# Patient Record
Sex: FEMALE | Race: WHITE | NOT HISPANIC OR LATINO | URBAN - METROPOLITAN AREA
[De-identification: names, ages, dates, MRNs, and addresses within clinical notes are randomized per-mention and may not be internally consistent; named-entity substitution may affect disease eponyms.]

---
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
## 2022-01-05 ENCOUNTER — LABORATORY RESULT (OUTPATIENT)
Age: 22
End: 2022-01-05

## 2022-01-06 ENCOUNTER — LABORATORY RESULT (OUTPATIENT)
Age: 22
End: 2022-01-06

## 2022-01-06 ENCOUNTER — APPOINTMENT (OUTPATIENT)
Dept: OBGYN | Facility: CLINIC | Age: 22
End: 2022-01-06
Payer: COMMERCIAL

## 2022-01-06 VITALS — HEIGHT: 61 IN | TEMPERATURE: 97.9 F

## 2022-01-06 PROCEDURE — 76830 TRANSVAGINAL US NON-OB: CPT

## 2022-01-06 PROCEDURE — 99214 OFFICE O/P EST MOD 30 MIN: CPT | Mod: 25

## 2022-12-09 ENCOUNTER — INPATIENT (INPATIENT)
Facility: HOSPITAL | Age: 22
LOS: 4 days | Discharge: HOME | End: 2022-12-14
Attending: INTERNAL MEDICINE | Admitting: INTERNAL MEDICINE
Payer: COMMERCIAL

## 2022-12-09 VITALS
SYSTOLIC BLOOD PRESSURE: 131 MMHG | DIASTOLIC BLOOD PRESSURE: 71 MMHG | HEART RATE: 87 BPM | TEMPERATURE: 98 F | OXYGEN SATURATION: 99 % | RESPIRATION RATE: 18 BRPM

## 2022-12-09 DIAGNOSIS — H61.001 UNSPECIFIED PERICHONDRITIS OF RIGHT EXTERNAL EAR: ICD-10-CM

## 2022-12-09 DIAGNOSIS — R43.8 OTHER DISTURBANCES OF SMELL AND TASTE: ICD-10-CM

## 2022-12-09 DIAGNOSIS — I96 GANGRENE, NOT ELSEWHERE CLASSIFIED: ICD-10-CM
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
## 2022-12-09 DIAGNOSIS — H60.01 ABSCESS OF RIGHT EXTERNAL EAR: ICD-10-CM

## 2022-12-09 DIAGNOSIS — D50.9 IRON DEFICIENCY ANEMIA, UNSPECIFIED: ICD-10-CM

## 2022-12-09 DIAGNOSIS — B96.5 PSEUDOMONAS (AERUGINOSA) (MALLEI) (PSEUDOMALLEI) AS THE CAUSE OF DISEASES CLASSIFIED ELSEWHERE: ICD-10-CM

## 2022-12-09 DIAGNOSIS — Z20.822 CONTACT WITH AND (SUSPECTED) EXPOSURE TO COVID-19: ICD-10-CM

## 2022-12-09 DIAGNOSIS — K58.0 IRRITABLE BOWEL SYNDROME WITH DIARRHEA: ICD-10-CM

## 2022-12-09 DIAGNOSIS — H60.11 CELLULITIS OF RIGHT EXTERNAL EAR: ICD-10-CM

## 2022-12-09 DIAGNOSIS — T36.95XA ADVERSE EFFECT OF UNSPECIFIED SYSTEMIC ANTIBIOTIC, INITIAL ENCOUNTER: ICD-10-CM

## 2022-12-09 PROCEDURE — 99285 EMERGENCY DEPT VISIT HI MDM: CPT

## 2022-12-09 RX ORDER — KETOROLAC TROMETHAMINE 30 MG/ML
15 SYRINGE (ML) INJECTION ONCE
Refills: 0 | Status: DISCONTINUED | OUTPATIENT
Start: 2022-12-09 | End: 2022-12-09

## 2022-12-10 LAB
ALBUMIN SERPL ELPH-MCNC: 4.7 G/DL — SIGNIFICANT CHANGE UP (ref 3.5–5.2)
ALP SERPL-CCNC: 62 U/L — SIGNIFICANT CHANGE UP (ref 30–115)
ALT FLD-CCNC: 13 U/L — LOW (ref 14–37)
ANION GAP SERPL CALC-SCNC: 11 MMOL/L — SIGNIFICANT CHANGE UP (ref 7–14)
AST SERPL-CCNC: 15 U/L — SIGNIFICANT CHANGE UP (ref 14–37)
BASOPHILS # BLD AUTO: 0.05 K/UL — SIGNIFICANT CHANGE UP (ref 0–0.2)
BASOPHILS NFR BLD AUTO: 0.4 % — SIGNIFICANT CHANGE UP (ref 0–1)
BILIRUB SERPL-MCNC: 0.2 MG/DL — SIGNIFICANT CHANGE UP (ref 0.2–1.2)
BUN SERPL-MCNC: 12 MG/DL — SIGNIFICANT CHANGE UP (ref 10–20)
CALCIUM SERPL-MCNC: 9.2 MG/DL — SIGNIFICANT CHANGE UP (ref 8.4–10.5)
CHLORIDE SERPL-SCNC: 101 MMOL/L — SIGNIFICANT CHANGE UP (ref 98–110)
CO2 SERPL-SCNC: 26 MMOL/L — SIGNIFICANT CHANGE UP (ref 17–32)
CREAT SERPL-MCNC: 0.7 MG/DL — SIGNIFICANT CHANGE UP (ref 0.7–1.5)
EGFR: 127 ML/MIN/1.73M2 — SIGNIFICANT CHANGE UP
EOSINOPHIL # BLD AUTO: 0.05 K/UL — SIGNIFICANT CHANGE UP (ref 0–0.7)
EOSINOPHIL NFR BLD AUTO: 0.4 % — SIGNIFICANT CHANGE UP (ref 0–8)
GLUCOSE SERPL-MCNC: 98 MG/DL — SIGNIFICANT CHANGE UP (ref 70–99)
HCG SERPL QL: NEGATIVE — SIGNIFICANT CHANGE UP
HCT VFR BLD CALC: 34.7 % — LOW (ref 37–47)
HCT VFR BLD CALC: 38.5 % — SIGNIFICANT CHANGE UP (ref 37–47)
HGB BLD-MCNC: 10.9 G/DL — LOW (ref 12–16)
HGB BLD-MCNC: 12.2 G/DL — SIGNIFICANT CHANGE UP (ref 12–16)
IMM GRANULOCYTES NFR BLD AUTO: 0.3 % — SIGNIFICANT CHANGE UP (ref 0.1–0.3)
LACTATE SERPL-SCNC: 0.7 MMOL/L — SIGNIFICANT CHANGE UP (ref 0.7–2)
LYMPHOCYTES # BLD AUTO: 18.5 % — LOW (ref 20.5–51.1)
LYMPHOCYTES # BLD AUTO: 2.3 K/UL — SIGNIFICANT CHANGE UP (ref 1.2–3.4)
MCHC RBC-ENTMCNC: 21.8 PG — LOW (ref 27–31)
MCHC RBC-ENTMCNC: 22.2 PG — LOW (ref 27–31)
MCHC RBC-ENTMCNC: 31.4 G/DL — LOW (ref 32–37)
MCHC RBC-ENTMCNC: 31.7 G/DL — LOW (ref 32–37)
MCV RBC AUTO: 69.5 FL — LOW (ref 81–99)
MCV RBC AUTO: 70.1 FL — LOW (ref 81–99)
MONOCYTES # BLD AUTO: 1.02 K/UL — HIGH (ref 0.1–0.6)
MONOCYTES NFR BLD AUTO: 8.2 % — SIGNIFICANT CHANGE UP (ref 1.7–9.3)
NEUTROPHILS # BLD AUTO: 9 K/UL — HIGH (ref 1.4–6.5)
NEUTROPHILS NFR BLD AUTO: 72.2 % — SIGNIFICANT CHANGE UP (ref 42.2–75.2)
NRBC # BLD: 0 /100 WBCS — SIGNIFICANT CHANGE UP (ref 0–0)
NRBC # BLD: 0 /100 WBCS — SIGNIFICANT CHANGE UP (ref 0–0)
PLATELET # BLD AUTO: 339 K/UL — SIGNIFICANT CHANGE UP (ref 130–400)
PLATELET # BLD AUTO: 422 K/UL — HIGH (ref 130–400)
POTASSIUM SERPL-MCNC: 4 MMOL/L — SIGNIFICANT CHANGE UP (ref 3.5–5)
POTASSIUM SERPL-SCNC: 4 MMOL/L — SIGNIFICANT CHANGE UP (ref 3.5–5)
PROT SERPL-MCNC: 7.4 G/DL — SIGNIFICANT CHANGE UP (ref 6–8)
RBC # BLD: 4.99 M/UL — SIGNIFICANT CHANGE UP (ref 4.2–5.4)
RBC # BLD: 5.49 M/UL — HIGH (ref 4.2–5.4)
RBC # FLD: 14.3 % — SIGNIFICANT CHANGE UP (ref 11.5–14.5)
RBC # FLD: 14.4 % — SIGNIFICANT CHANGE UP (ref 11.5–14.5)
SARS-COV-2 RNA SPEC QL NAA+PROBE: SIGNIFICANT CHANGE UP
SODIUM SERPL-SCNC: 138 MMOL/L — SIGNIFICANT CHANGE UP (ref 135–146)
WBC # BLD: 12.46 K/UL — HIGH (ref 4.8–10.8)
WBC # BLD: 12.75 K/UL — HIGH (ref 4.8–10.8)
WBC # FLD AUTO: 12.46 K/UL — HIGH (ref 4.8–10.8)
WBC # FLD AUTO: 12.75 K/UL — HIGH (ref 4.8–10.8)

## 2022-12-10 PROCEDURE — 99254 IP/OBS CNSLTJ NEW/EST MOD 60: CPT

## 2022-12-10 PROCEDURE — 70481 CT ORBIT/EAR/FOSSA W/DYE: CPT | Mod: 26,MA

## 2022-12-10 RX ORDER — CIPROFLOXACIN LACTATE 400MG/40ML
400 VIAL (ML) INTRAVENOUS ONCE
Refills: 0 | Status: COMPLETED | OUTPATIENT
Start: 2022-12-10 | End: 2022-12-10

## 2022-12-10 RX ORDER — MORPHINE SULFATE 50 MG/1
4 CAPSULE, EXTENDED RELEASE ORAL ONCE
Refills: 0 | Status: DISCONTINUED | OUTPATIENT
Start: 2022-12-10 | End: 2022-12-10

## 2022-12-10 RX ORDER — CIPROFLOXACIN LACTATE 400MG/40ML
1 VIAL (ML) INTRAVENOUS
Qty: 20 | Refills: 0
Start: 2022-12-10 | End: 2022-12-19

## 2022-12-10 RX ORDER — KETOROLAC TROMETHAMINE 30 MG/ML
30 SYRINGE (ML) INJECTION ONCE
Refills: 0 | Status: DISCONTINUED | OUTPATIENT
Start: 2022-12-10 | End: 2022-12-11

## 2022-12-10 RX ORDER — CHLORHEXIDINE GLUCONATE 213 G/1000ML
1 SOLUTION TOPICAL
Refills: 0 | Status: DISCONTINUED | OUTPATIENT
Start: 2022-12-10 | End: 2022-12-13

## 2022-12-10 RX ORDER — IBUPROFEN 200 MG
200 TABLET ORAL EVERY 8 HOURS
Refills: 0 | Status: DISCONTINUED | OUTPATIENT
Start: 2022-12-10 | End: 2022-12-10

## 2022-12-10 RX ORDER — KETOROLAC TROMETHAMINE 30 MG/ML
30 SYRINGE (ML) INJECTION EVERY 6 HOURS
Refills: 0 | Status: DISCONTINUED | OUTPATIENT
Start: 2022-12-10 | End: 2022-12-10

## 2022-12-10 RX ORDER — ACETAMINOPHEN 500 MG
650 TABLET ORAL EVERY 6 HOURS
Refills: 0 | Status: DISCONTINUED | OUTPATIENT
Start: 2022-12-10 | End: 2022-12-13

## 2022-12-10 RX ORDER — KETOROLAC TROMETHAMINE 30 MG/ML
15 SYRINGE (ML) INJECTION ONCE
Refills: 0 | Status: DISCONTINUED | OUTPATIENT
Start: 2022-12-10 | End: 2022-12-10

## 2022-12-10 RX ORDER — ONDANSETRON 8 MG/1
4 TABLET, FILM COATED ORAL EVERY 6 HOURS
Refills: 0 | Status: DISCONTINUED | OUTPATIENT
Start: 2022-12-10 | End: 2022-12-13

## 2022-12-10 RX ORDER — CEPHALEXIN 500 MG
1 CAPSULE ORAL
Qty: 40 | Refills: 0
Start: 2022-12-10 | End: 2022-12-19

## 2022-12-10 RX ORDER — KETOROLAC TROMETHAMINE 30 MG/ML
15 SYRINGE (ML) INJECTION EVERY 6 HOURS
Refills: 0 | Status: DISCONTINUED | OUTPATIENT
Start: 2022-12-10 | End: 2022-12-13

## 2022-12-10 RX ORDER — VANCOMYCIN HCL 1 G
1000 VIAL (EA) INTRAVENOUS ONCE
Refills: 0 | Status: COMPLETED | OUTPATIENT
Start: 2022-12-10 | End: 2022-12-10

## 2022-12-10 RX ADMIN — MORPHINE SULFATE 4 MILLIGRAM(S): 50 CAPSULE, EXTENDED RELEASE ORAL at 14:07

## 2022-12-10 RX ADMIN — Medication 200 MILLIGRAM(S): at 05:30

## 2022-12-10 RX ADMIN — Medication 15 MILLIGRAM(S): at 23:30

## 2022-12-10 RX ADMIN — Medication 200 MILLIGRAM(S): at 22:18

## 2022-12-10 RX ADMIN — Medication 15 MILLIGRAM(S): at 00:30

## 2022-12-10 RX ADMIN — Medication 650 MILLIGRAM(S): at 10:03

## 2022-12-10 RX ADMIN — Medication 250 MILLIGRAM(S): at 06:15

## 2022-12-10 RX ADMIN — Medication 650 MILLIGRAM(S): at 17:44

## 2022-12-10 RX ADMIN — Medication 15 MILLIGRAM(S): at 06:15

## 2022-12-10 RX ADMIN — Medication 650 MILLIGRAM(S): at 17:15

## 2022-12-10 RX ADMIN — ONDANSETRON 4 MILLIGRAM(S): 8 TABLET, FILM COATED ORAL at 12:56

## 2022-12-10 RX ADMIN — Medication 15 MILLIGRAM(S): at 14:06

## 2022-12-10 RX ADMIN — Medication 1000 MILLIGRAM(S): at 14:06

## 2022-12-10 RX ADMIN — Medication 650 MILLIGRAM(S): at 11:12

## 2022-12-10 RX ADMIN — MORPHINE SULFATE 4 MILLIGRAM(S): 50 CAPSULE, EXTENDED RELEASE ORAL at 04:30

## 2022-12-10 NOTE — ED PROVIDER NOTE - PROGRESS NOTE DETAILS
jfk: d/w Dr Jewell from radiology and he does not see evidence of mastoiditis or an abscess (though somewhat limited view of ear due streak artifact).

## 2022-12-10 NOTE — ED PROVIDER NOTE - PHYSICAL EXAMINATION
GENERAL: Well-nourished, Well-developed. NAD.  HEAD: No visible or palpable bumps or hematomas. No ecchymosis behind ears B/L.  Eyes: PERRLA, EOMI. No asymmetry. No nystagmus. No conjunctival injection. Non-icteric sclera.  ENMT: MMM. (+)R ear auricular region swelling with erythema, small amount of purulent drainage from piercing hole  Neck: Supple. FROM  CVS: Normal S1,S2. No murmurs appreciated on auscultation   RESP: Lungs clear to auscultation B/L. No wheezing, rales, or rhonchi auscultated.  Skin: Warm, Dry. No rashes or lesions. Good cap refill < 2 sec B/L.

## 2022-12-10 NOTE — ED PROVIDER NOTE - NS ED ATTENDING STATEMENT MOD
This was a shared visit with the [facility]. I reviewed and verified the documentation and independently performed the documented:

## 2022-12-10 NOTE — ED PROVIDER NOTE - NS ED ROS FT
Constitutional: No fever, chills.  Eyes:  No visual changes  ENMT: (+)ear pain. No neck pain  Cardiac:  No chest pain  Respiratory:  No cough, SOB  GI:  No nausea, vomiting  Neuro:  No headache or lightheadedness  Skin:  No skin rash  Endocrine: No history of thyroid disease or diabetes.  Except as documented in the HPI,  all other systems are negative.

## 2022-12-10 NOTE — ED ADULT NURSE NOTE - NSIMPLEMENTINTERV_GEN_ALL_ED
Implemented All Universal Safety Interventions:  [place] to call system. Call bell, personal items and telephone within reach. Instruct patient to call for assistance. Room bathroom lighting operational. Non-slip footwear when patient is off stretcher. Physically safe environment: no spills, clutter or unnecessary equipment. Stretcher in lowest position, wheels locked, appropriate side rails in place.

## 2022-12-10 NOTE — ED PROVIDER NOTE - OBJECTIVE STATEMENT
22-year-old female no past medical history presenting to the ED for evaluation of right ear infection x1 week.  Patient reports she had her ear pierced on November 18 and noted over the past week has had increased swelling and pain and redness to the right ear.  Patient removed the earring yesterday however pain and swelling persist, notes there is purulent drainage coming from the hole piercing was in.  Denies fever, chills, hearing changes, neck pain.

## 2022-12-10 NOTE — H&P ADULT - NSHPLABSRESULTS_GEN_ALL_CORE
12.2   12.46 )-----------( 422      ( 10 Dec 2022 00:23 )             38.5       12-10    138  |  101  |  12  ----------------------------<  98  4.0   |  26  |  0.7    Ca    9.2      10 Dec 2022 00:23    TPro  7.4  /  Alb  4.7  /  TBili  0.2  /  DBili  x   /  AST  15  /  ALT  13<L>  /  AlkPhos  62  12-10            Lactate Trend  12-10 @ 00:23 Lactate:0.7       < from: CT Temporal Bones w/ IV Cont (12.10.22 @ 01:58) >      IMPRESSION:  1.  Mild subcutaneous soft tissue swelling involving the right   periauricular soft tissues, possibly sequela of otitis externa.  2.  No CT evidence of mastoiditis.        ******PRELIMINARY REPORT******      < end of copied text >      Serum Pregnancy: Negative (12-10-22 @ 00:23)

## 2022-12-10 NOTE — H&P ADULT - ASSESSMENT
A 22-year-old female with pmhx of IBS,  s/p piercing  R ear on 11/18  and removal on 12/6 present with 1x week of right ear infection.     #Right ear perichondritis   -CT temp bone showing mild subcutaneous soft tissue swelling involving the right   periauricular soft tissues, No CT evidence of mastoiditis  -s/p I and D in ED   -IV abx  - blood cultures   -pt to be transferred north for ENT eval   -pain meds prn   -monitor for fever   -trend wbc     #IBS  -not on home meds       A 22-year-old female with pmhx of IBS,  s/p piercing  R ear on 11/18  and removal on 12/6 present with 1x week of right ear infection.     #Right ear perichondritis   - no sepsis POA  -CT temp bone showing mild subcutaneous soft tissue swelling involving the right   periauricular soft tissues, No CT evidence of mastoiditis  -s/p I and D in ED   -blood cultures   -pt to be transferred north for ENT eval   -pain meds prn   -monitor for fever   -trend wbc   -Levaquin     #IBS  -not on home meds  - not in flare  - outpatient gi eval    # Microcytic anemia  - Hv 10.9   - check iron panel and ferritin  - no signs of bleeding  - monitor cbc  - active type and screen     # DVT PPX: not indicated  # GI PPX: not indicated  # Diet: regular  # Dispo: acute  # Handoff: ENT eval , IV ABX, ID eval

## 2022-12-10 NOTE — H&P ADULT - HISTORY OF PRESENT ILLNESS
A 22-year-old female with pmhx of IBS presenting to the ED for evaluation of right ear infection x1 week.  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 Pt reports on Tuesday her dog accidentally pressed on her ear. PT states on Tuesday she removed her piercing. Pt states has been cleaning her piercing with H2o [place] spray. Pt reports clear discharge. Pt reports has been having more pain and swelling every day. Last piercing on her right ear was 6 months ago. Pt denies fever, chills, chest pain, shortness of breath, burning with urination. Pt reports chronic diarrhea from IBS. Pt had I& D done in ER.

## 2022-12-10 NOTE — ED PROVIDER NOTE - ATTENDING APP SHARED VISIT CONTRIBUTION OF CARE
22-year-old female with a recent right ear piercing presents with 2 days of progressively worsening swelling and redness.  Also noted some discharge today.  Remove the earring couple of days ago.  Has some pain around the ear and that side of her head but not holocephalic.  No neck stiffness.  No nausea or vomiting.  On exam nontoxic, vital signs stable, no meningismus, right auricle diffusely swollen with central area of fluctuance and mild purulent drainage.  Some posterior auricular swelling and tenderness over the mastoid.  CT done and no signs of mastoiditis on prelim.  No signs of abscess.  Patient had I&D with some mild purulent drainage.  Does have evidence of perichondritis and given the degree of swelling will admit for IV antibiotics given concern for cosmetic outcome and feel that this would improve overall treatment and recovery.  Also will have ENT evaluate at [place].

## 2022-12-10 NOTE — CONSULT NOTE ADULT - ASSESSMENT
Pt is a 23yo Female with PMH of IBS who presents to [facility] ED, transferred North  for R ear swelling/redness for more than 1 week- ENT c/s for evaluation of  Right sided perichondritis s/p I&D by [facility] ED. CT Temporal bone Significant swelling of the right auricular and periauricular soft tissues consistent with otitis externa. No evidence of abscess.     A) Perichondritis with OE     Plan   - Continue IV abx per ID   - F/u ID c/s recommendation   - Ciprodex drops 5gtt BID to RIGHT ear for 7 days   - Analgesics for pain control prn   - f/u AM labs   - Monitor VS   - Continue management per primary team   - Will follow   - Will d/w attending

## 2022-12-10 NOTE — H&P ADULT - NSHPPHYSICALEXAM_GEN_ALL_CORE
VITALS:     ICU Vital Signs Last 24 Hrs  T(C): 36.2 (10 Dec 2022 07:12), Max: 36.6 (09 Dec 2022 23:26)  T(F): 97.1 (10 Dec 2022 07:12), Max: 97.9 (09 Dec 2022 23:26)  HR: 70 (10 Dec 2022 07:12) (61 - 87)  BP: 117/59 (10 Dec 2022 07:12) (117/59 - 131/71)  RR: 18 (10 Dec 2022 03:13) (18 - 19)  SpO2: 99% (10 Dec 2022 03:13) (99% - 99%)    O2 Parameters below as of 10 Dec 2022 03:13  Patient On (Oxygen Delivery Method): room air      GENERAL: NAD, sitting in chair comfortably  HEAD:  Atraumatic, Normocephalic  EYES: EOMI, PERRLA, conjunctiva and sclera clear, right ear s/p I and D, erythema , tenderness, no discharge, two piercing present, no scratch marks.   ENT: Moist mucous membranes  NECK: Supple, No JVD  CHEST/LUNG: Clear to auscultation bilaterally; No rales, rhonchi, wheezing, or rubs. Unlabored respirations  HEART: Regular rate and rhythm; No murmurs, rubs, or gallops  ABDOMEN: Soft, Nontender, Nondistended. No hepatomegally  EXTREMITIES:  2+ Peripheral Pulses, brisk capillary refill. No clubbing, cyanosis, or edema  NERVOUS SYSTEM:  Alert & Oriented X3, speech clear. No deficits   MSK: FROM all 4 extremities, full and equal strength

## 2022-12-10 NOTE — ED PEDIATRIC NURSE REASSESSMENT NOTE - NS ED NURSE REASSESS COMMENT FT2
Received patient from [facility] [place] ED. No s/s of distress noted, comfort measures offered. Awaiting disposition, will f/u.

## 2022-12-10 NOTE — ED PROVIDER NOTE - CLINICAL SUMMARY MEDICAL DECISION MAKING FREE TEXT BOX
R ear perichondritis  not spetic  no CT eveidence of mastoiditis  minimal output from I&D. given degree of cellulitis, will admit for IV abx  send to [place] for ENT eval

## 2022-12-11 LAB
ALBUMIN SERPL ELPH-MCNC: 4.3 G/DL — SIGNIFICANT CHANGE UP (ref 3.5–5.2)
ALP SERPL-CCNC: 51 U/L — SIGNIFICANT CHANGE UP (ref 30–115)
ALT FLD-CCNC: 15 U/L — SIGNIFICANT CHANGE UP (ref 14–37)
ANION GAP SERPL CALC-SCNC: 14 MMOL/L — SIGNIFICANT CHANGE UP (ref 7–14)
AST SERPL-CCNC: 15 U/L — SIGNIFICANT CHANGE UP (ref 14–37)
BASOPHILS # BLD AUTO: 0.06 K/UL — SIGNIFICANT CHANGE UP (ref 0–0.2)
BASOPHILS NFR BLD AUTO: 0.6 % — SIGNIFICANT CHANGE UP (ref 0–1)
BILIRUB SERPL-MCNC: <0.2 MG/DL — SIGNIFICANT CHANGE UP (ref 0.2–1.2)
BLD GP AB SCN SERPL QL: SIGNIFICANT CHANGE UP
BUN SERPL-MCNC: 11 MG/DL — SIGNIFICANT CHANGE UP (ref 10–20)
CALCIUM SERPL-MCNC: 9.1 MG/DL — SIGNIFICANT CHANGE UP (ref 8.4–10.4)
CHLORIDE SERPL-SCNC: 105 MMOL/L — SIGNIFICANT CHANGE UP (ref 98–110)
CO2 SERPL-SCNC: 24 MMOL/L — SIGNIFICANT CHANGE UP (ref 17–32)
CREAT SERPL-MCNC: 0.7 MG/DL — SIGNIFICANT CHANGE UP (ref 0.7–1.5)
EGFR: 127 ML/MIN/1.73M2 — SIGNIFICANT CHANGE UP
EOSINOPHIL # BLD AUTO: 0.12 K/UL — SIGNIFICANT CHANGE UP (ref 0–0.7)
EOSINOPHIL NFR BLD AUTO: 1.3 % — SIGNIFICANT CHANGE UP (ref 0–8)
GLUCOSE SERPL-MCNC: 98 MG/DL — SIGNIFICANT CHANGE UP (ref 70–99)
HCT VFR BLD CALC: 36.2 % — LOW (ref 37–47)
HGB BLD-MCNC: 11.5 G/DL — LOW (ref 12–16)
IMM GRANULOCYTES NFR BLD AUTO: 0.3 % — SIGNIFICANT CHANGE UP (ref 0.1–0.3)
IRON SATN MFR SERPL: 14 % — LOW (ref 15–50)
IRON SATN MFR SERPL: 45 UG/DL — SIGNIFICANT CHANGE UP (ref 35–150)
LYMPHOCYTES # BLD AUTO: 2.52 K/UL — SIGNIFICANT CHANGE UP (ref 1.2–3.4)
LYMPHOCYTES # BLD AUTO: 26.4 % — SIGNIFICANT CHANGE UP (ref 20.5–51.1)
MCHC RBC-ENTMCNC: 22.1 PG — LOW (ref 27–31)
MCHC RBC-ENTMCNC: 31.8 G/DL — LOW (ref 32–37)
MCV RBC AUTO: 69.5 FL — LOW (ref 81–99)
MONOCYTES # BLD AUTO: 1.03 K/UL — HIGH (ref 0.1–0.6)
MONOCYTES NFR BLD AUTO: 10.8 % — HIGH (ref 1.7–9.3)
NEUTROPHILS # BLD AUTO: 5.8 K/UL — SIGNIFICANT CHANGE UP (ref 1.4–6.5)
NEUTROPHILS NFR BLD AUTO: 60.6 % — SIGNIFICANT CHANGE UP (ref 42.2–75.2)
NRBC # BLD: 0 /100 WBCS — SIGNIFICANT CHANGE UP (ref 0–0)
PLATELET # BLD AUTO: 352 K/UL — SIGNIFICANT CHANGE UP (ref 130–400)
POTASSIUM SERPL-MCNC: 4.2 MMOL/L — SIGNIFICANT CHANGE UP (ref 3.5–5)
POTASSIUM SERPL-SCNC: 4.2 MMOL/L — SIGNIFICANT CHANGE UP (ref 3.5–5)
PROT SERPL-MCNC: 6.4 G/DL — SIGNIFICANT CHANGE UP (ref 6–8)
RBC # BLD: 5.21 M/UL — SIGNIFICANT CHANGE UP (ref 4.2–5.4)
RBC # FLD: 14.9 % — HIGH (ref 11.5–14.5)
SODIUM SERPL-SCNC: 143 MMOL/L — SIGNIFICANT CHANGE UP (ref 135–146)
TIBC SERPL-MCNC: 333 UG/DL — SIGNIFICANT CHANGE UP (ref 220–430)
UIBC SERPL-MCNC: 288 UG/DL — SIGNIFICANT CHANGE UP (ref 110–370)
WBC # BLD: 9.56 K/UL — SIGNIFICANT CHANGE UP (ref 4.8–10.8)
WBC # FLD AUTO: 9.56 K/UL — SIGNIFICANT CHANGE UP (ref 4.8–10.8)

## 2022-12-11 PROCEDURE — 99233 SBSQ HOSP IP/OBS HIGH 50: CPT

## 2022-12-11 PROCEDURE — 99252 IP/OBS CONSLTJ NEW/EST SF 35: CPT | Mod: GC

## 2022-12-11 RX ORDER — DEXAMETHASONE 0.5 MG/5ML
6 ELIXIR ORAL EVERY 8 HOURS
Refills: 0 | Status: DISCONTINUED | OUTPATIENT
Start: 2022-12-11 | End: 2022-12-11

## 2022-12-11 RX ORDER — VANCOMYCIN HCL 1 G
1250 VIAL (EA) INTRAVENOUS EVERY 12 HOURS
Refills: 0 | Status: DISCONTINUED | OUTPATIENT
Start: 2022-12-11 | End: 2022-12-13

## 2022-12-11 RX ORDER — CEFEPIME 1 G/1
2000 INJECTION, POWDER, FOR SOLUTION INTRAMUSCULAR; INTRAVENOUS EVERY 12 HOURS
Refills: 0 | Status: DISCONTINUED | OUTPATIENT
Start: 2022-12-11 | End: 2022-12-13

## 2022-12-11 RX ORDER — CIPROFLOXACIN HCL 0.3 %
3 DROPS OPHTHALMIC (EYE) EVERY 12 HOURS
Refills: 0 | Status: DISCONTINUED | OUTPATIENT
Start: 2022-12-11 | End: 2022-12-11

## 2022-12-11 RX ORDER — INFLUENZA VIRUS VACCINE 15; 15; 15; 15 UG/.5ML; UG/.5ML; UG/.5ML; UG/.5ML
0.5 SUSPENSION INTRAMUSCULAR ONCE
Refills: 0 | Status: DISCONTINUED | OUTPATIENT
Start: 2022-12-11 | End: 2022-12-14

## 2022-12-11 RX ADMIN — Medication 30 MILLIGRAM(S): at 15:11

## 2022-12-11 RX ADMIN — Medication 15 MILLIGRAM(S): at 08:13

## 2022-12-11 RX ADMIN — Medication 100 MILLIGRAM(S): at 09:10

## 2022-12-11 RX ADMIN — Medication 15 MILLIGRAM(S): at 00:17

## 2022-12-11 RX ADMIN — Medication 15 MILLIGRAM(S): at 09:11

## 2022-12-11 RX ADMIN — Medication 100 MILLIGRAM(S): at 21:01

## 2022-12-11 RX ADMIN — Medication 100 MILLIGRAM(S): at 15:10

## 2022-12-11 RX ADMIN — Medication 200 MILLIGRAM(S): at 00:18

## 2022-12-11 RX ADMIN — Medication 166.67 MILLIGRAM(S): at 17:56

## 2022-12-11 RX ADMIN — CEFEPIME 100 MILLIGRAM(S): 1 INJECTION, POWDER, FOR SOLUTION INTRAMUSCULAR; INTRAVENOUS at 17:56

## 2022-12-11 NOTE — CONSULT NOTE ADULT - SUBJECTIVE AND OBJECTIVE BOX
Patient is a 22y old  Female who presents with a chief complaint of R ear perichondritis s/p piercing 11/18 (11 Dec 2022 13:11)      INTERVAL HPI/OVERNIGHT EVENTS: no overnight events   ICU Vital Signs Last 24 Hrs  T(C): 36.7 (11 Dec 2022 08:00), Max: 36.9 (10 Dec 2022 22:16)  T(F): 98.1 (11 Dec 2022 08:00), Max: 98.5 (10 Dec 2022 22:16)  HR: 55 (11 Dec 2022 08:00) (55 - 76)  BP: 105/52 (11 Dec 2022 08:00) (105/52 - 122/90)  BP(mean): --  ABP: --  ABP(mean): --  RR: 18 (11 Dec 2022 08:00) (18 - 18)  SpO2: 99% (10 Dec 2022 23:05) (99% - 99%)    O2 Parameters below as of 10 Dec 2022 22:16  Patient On (Oxygen Delivery Method): room air          Allergies    No Known Allergies    Intolerances        Lab Results:                        11.5   9.56  )-----------( 352      ( 11 Dec 2022 05:23 )             36.2     12-11    143  |  105  |  11  ----------------------------<  98  4.2   |  24  |  0.7    Ca    9.1      11 Dec 2022 05:23    TPro  6.4  /  Alb  4.3  /  TBili  <0.2  /  DBili  x   /  AST  15  /  ALT  15  /  AlkPhos  51  12-11      LIVER FUNCTIONS - ( 11 Dec 2022 05:23 )  Alb: 4.3 g/dL / Pro: 6.4 g/dL / ALK PHOS: 51 U/L / ALT: 15 U/L / AST: 15 U/L / GGT: x             MEDICATIONS  (STANDING):  cefepime   IVPB 2000 milliGRAM(s) IV Intermittent every 12 hours  chlorhexidine 2% Cloths 1 Application(s) Topical <User Schedule>  ciprofloxacin  0.3% Otic Solution 3 Drop(s) Right Ear every 12 hours  clindamycin IVPB      clindamycin IVPB 900 milliGRAM(s) IV Intermittent every 8 hours  dexAMETHasone     Tablet 6 milliGRAM(s) Oral every 8 hours  influenza   Vaccine 0.5 milliLiter(s) IntraMuscular once  ketorolac   Injectable 30 milliGRAM(s) IV Push once  vancomycin  IVPB 1250 milliGRAM(s) IV Intermittent every 12 hours    MEDICATIONS  (PRN):  acetaminophen     Tablet .. 650 milliGRAM(s) Oral every 6 hours PRN Temp greater or equal to 38C (100.4F), Mild Pain (1 - 3)  ketorolac   Injectable 15 milliGRAM(s) IV Push every 6 hours PRN Moderate Pain (4 - 6)  ondansetron Injectable 4 milliGRAM(s) IV Push every 6 hours PRN Nausea and/or Vomiting      PHYSICAL EXAM  GENERAL: NAD, well-nourished  HEAD:  Atraumatic, Normocephalic  ENT: + right ear edematous/erythematous Right helix extending to antihelix and dangelo area with dried scabbing and slight pus/yellow drainage from piercing site at antihelix (location of piercing), firm w very minimal area of fluctuance on palpation. +pre-auricular tenderness and post auricular tenderness, and mild mastoid tenderness.    NECK: Supple  CHEST/LUNG: unlabored respirations, no tachypnea on RA  
Pt is a 21yo Female with PMH of IBS who presents to [facility] ED, transferred North  for R ear swelling/redness for more than 1 week- ENT c/s for evaluation of  Right sided perichondritis s/p I&D by [facility] ED. Patient seen and examined at bedside. Patient reports she recently had a snug piercing placed on 11/18 at [employer] where she works. She states she did have some swelling and pain post piercing; however she used H20 piercing spray to clean area. Patient reports on Tuesday, patient's dog accidently pressed on her ear while she was sleeping. She decided to remove piercing on Tuesday and noticed clear discharge coming from ear with worsening swelling and redness localized to ear with mild muffled hearing.  She states she recently had Influenza and had subjective fever 1-2 weeks ago which has since resolved. Denies chills, N/V, SOB/difficulty breathing, tinnitus, otorrhea,     In [facility] ED, patient was given auricular block with lidocaine and then I&D along antihelix (where previous piercing was), scant purulence and then blood noted <1cc was noted, no culture was sent from ED. Patient still admits to residual pain, with burning sensation to back of ear post I&D.     PAST MEDICAL & SURGICAL HISTORY:  IBS (irritable bowel syndrome)          MEDICATIONS  (STANDING):  chlorhexidine 2% Cloths 1 Application(s) Topical <User Schedule>  levoFLOXacin IVPB 750 milliGRAM(s) IV Intermittent every 24 hours    MEDICATIONS  (PRN):  acetaminophen     Tablet .. 650 milliGRAM(s) Oral every 6 hours PRN Temp greater or equal to 38C (100.4F), Mild Pain (1 - 3)  ondansetron Injectable 4 milliGRAM(s) IV Push every 6 hours PRN Nausea and/or Vomiting      Allergies    No Known Allergies    Intolerances          SOCIAL HISTORY:    FAMILY HISTORY:      REVIEW OF SYSTEMS   [x] A ten-point review of systems was otherwise negative except as noted.    Vital Signs Last 24 Hrs  T(C): 36.8 (10 Dec 2022 13:58), Max: 36.8 (10 Dec 2022 13:58)  T(F): 98.2 (10 Dec 2022 13:58), Max: 98.2 (10 Dec 2022 13:58)  HR: 73 (10 Dec 2022 13:58) (61 - 87)  BP: 116/61 (10 Dec 2022 13:58) (116/61 - 131/71)  RR: 16 (10 Dec 2022 13:58) (16 - 19)  SpO2: 96% (10 Dec 2022 13:58) (96% - 99%)    Parameters below as of 10 Dec 2022 13:58  Patient On (Oxygen Delivery Method): room air        GEN: Well-developed, well-nourished. NAD, awake and alert. No drooling or pooling of secretions. No stridor or stertor. Good vocal quality, no hoarseness.   SKIN: Good color, non diaphoretic.  HEENT: NC/AT; EARS: RIGHT: Edematous/erythematous Right helix extending to antihelix, noted with dried scabbing/drainage at antihelix (location of piercing), redness noted along dangelo with piercing noted, +pre-auricular tendernss/post auricular tenderness, EAC with edema/erythema, partial visualization of TM, mild mastoid tenderness. LEFT: multiple piercing along left external ear, wnl   Oral mucosa prink and moist. No erythema or edema noted to buccal mucosa, tongue, FOM, uvula or posterior oropharynx. Uvula midline.   NECK: Traceha midline, Neck supple, no TTP to B/L lateral neck, no cervical LAD.  RESP: No dyspnea, non-labored breathing. No use of accessory muscles.   CARDIO: +S1/S2  ABDO: Soft, NT.  EXT: MEJIA x 4      LABS:                        10.9   12.75 )-----------( 339      ( 10 Dec 2022 08:00 )             34.7     12-10    138  |  101  |  12  ----------------------------<  98  4.0   |  26  |  0.7    Ca    9.2      10 Dec 2022 00:23    TPro  7.4  /  Alb  4.7  /  TBili  0.2  /  DBili  x   /  AST  15  /  ALT  13<L>  /  AlkPhos  62  12-10          RADIOLOGY & ADDITIONAL STUDIES:  < from: CT Temporal Bones w/ IV Cont (12.10.22 @ 01:58) >  ACC: [id number] EXAM:  CT TEMPORAL BONES IC                          PROCEDURE DATE:  12/10/2022          INTERPRETATION:  Clinical History / Reason for exam: Evaluate for right   mastoiditis.    Technique: CT of the temporal bones without contrast.Contiguous CT   axial images of the temporal bones following administration of 90 cc   Omnipaque 350 intravenous contrast, with targeted axial, coronal and   sagittal reformats of each side.    Comparison: None available    Findings:    Right side: There is significant swelling of the right auricular soft   tissues. There is mild periauricular subcutaneous fat stranding and soft   tissue swelling. Prominent right cervical chain lymph nodes, largest   posterior auricular lymph node measures 1.1 cm.  The external auditory canal is otherwise unremarkable. The middle ear   cavity is clear.  The ossicles are intact without evidence of erosion.    The inner ear structures appear normal. The right mastoid air cells are   well developed and well aerated.  The vascular structures appear normal.    Left side:  The external auditory canal appears normal.  The middle ear   cavity is clear.  The ossicles are intact without evidence of erosion.    The inner ear structures appear normal. The left mastoid air cells are   well developed and well aerated.  The vascular structures appear normal.    There is partial opacification of the left sphenoid sinus      IMPRESSION:  Significant swelling of the right auricular and periauricular soft   tissues consistent with otitis externa.    --- End of Report ---      JOSEPH RAY MD; Resident Radiologist  This document has been electronically signed.  CAMILO JAUREGUI MD; Attending Radiologist  This document has been electronically signed. Dec 10 2022  9:58AM    < end of copied text >  
Appropriate
  ROSIE KAPADIA  22y, Female  Allergy: No Known Allergies      All historical available data reviewed.    HPI:  A 22-year-old female with pmhx of IBS presenting to the ED for evaluation of right ear infection x1 week.  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 Pt reports on Tuesday her dog accidentally pressed on her ear. PT states on Tuesday she removed her piercing. Pt states has been cleaning her piercing with H2o [place] spray. Pt reports clear discharge. Pt reports has been having more pain and swelling every day. Last piercing on her right ear was 6 months ago. Pt denies fever, chills, chest pain, shortness of breath, burning with urination. Pt reports chronic diarrhea from IBS. Pt had I& D done in ER.        (10 Dec 2022 07:29)    FAMILY HISTORY:    PAST MEDICAL & SURGICAL HISTORY:  IBS (irritable bowel syndrome)            VITALS:  T(F): 98.3, Max: 98.5 (12-10-22 @ 22:16)  HR: 58  BP: 122/90  RR: 18Vital Signs Last 24 Hrs  T(C): 36.8 (10 Dec 2022 23:05), Max: 36.9 (10 Dec 2022 22:16)  T(F): 98.3 (10 Dec 2022 23:05), Max: 98.5 (10 Dec 2022 22:16)  HR: 58 (10 Dec 2022 23:05) (58 - 76)  BP: 122/90 (10 Dec 2022 23:05) (111/59 - 122/90)  BP(mean): --  RR: 18 (10 Dec 2022 23:05) (16 - 18)  SpO2: 99% (10 Dec 2022 23:05) (96% - 99%)    Parameters below as of 10 Dec 2022 22:16  Patient On (Oxygen Delivery Method): room air        TESTS & MEASUREMENTS:                        10.9   12.75 )-----------( 339      ( 10 Dec 2022 08:00 )             34.7     12-10    138  |  101  |  12  ----------------------------<  98  4.0   |  26  |  0.7    Ca    9.2      10 Dec 2022 00:23    TPro  7.4  /  Alb  4.7  /  TBili  0.2  /  DBili  x   /  AST  15  /  ALT  13<L>  /  AlkPhos  62  12-10    LIVER FUNCTIONS - ( 10 Dec 2022 00:23 )  Alb: 4.7 g/dL / Pro: 7.4 g/dL / ALK PHOS: 62 U/L / ALT: 13 U/L / AST: 15 U/L / GGT: x                   RADIOLOGY & ADDITIONAL TESTS:  Personal review of radiological diagnostics performed  Echo and EKG results noted when applicable.     MEDICATIONS:  acetaminophen     Tablet .. 650 milliGRAM(s) Oral every 6 hours PRN  chlorhexidine 2% Cloths 1 Application(s) Topical <User Schedule>  influenza   Vaccine 0.5 milliLiter(s) IntraMuscular once  ketorolac   Injectable 30 milliGRAM(s) IV Push once  ketorolac   Injectable 15 milliGRAM(s) IV Push every 6 hours PRN  levoFLOXacin IVPB 750 milliGRAM(s) IV Intermittent every 24 hours  ondansetron Injectable 4 milliGRAM(s) IV Push every 6 hours PRN      ANTIBIOTICS:  levoFLOXacin IVPB 750 milliGRAM(s) IV Intermittent every 24 hours

## 2022-12-11 NOTE — PROGRESS NOTE ADULT - SUBJECTIVE AND OBJECTIVE BOX
ENT DAILY PROGRESS NOTE    Pt is a 21yo Female with PMH of IBS who presents to [facility] ED, transferred North  for R ear swelling/redness for more than 1 week- ENT c/s for evaluation of  Right sided perichondritis s/p I&D by [facility] ED    REVIEW OF SYSTEMS   [x] A ten-point review of systems was otherwise negative except as noted.    Allergies    No Known Allergies    Intolerances        MEDICATIONS:  acetaminophen     Tablet .. 650 milliGRAM(s) Oral every 6 hours PRN  chlorhexidine 2% Cloths 1 Application(s) Topical <User Schedule>  influenza   Vaccine 0.5 milliLiter(s) IntraMuscular once  ketorolac   Injectable 30 milliGRAM(s) IV Push once  ketorolac   Injectable 15 milliGRAM(s) IV Push every 6 hours PRN  levoFLOXacin IVPB 750 milliGRAM(s) IV Intermittent every 24 hours  ondansetron Injectable 4 milliGRAM(s) IV Push every 6 hours PRN      Vital Signs Last 24 Hrs  T(C): 36.8 (10 Dec 2022 23:05), Max: 36.9 (10 Dec 2022 22:16)  T(F): 98.3 (10 Dec 2022 23:05), Max: 98.5 (10 Dec 2022 22:16)  HR: 58 (10 Dec 2022 23:05) (58 - 76)  BP: 122/90 (10 Dec 2022 23:05) (111/59 - 122/90)  RR: 18 (10 Dec 2022 23:05) (16 - 18)  SpO2: 99% (10 Dec 2022 23:05) (96% - 99%)    Parameters below as of 10 Dec 2022 22:16  Patient On (Oxygen Delivery Method): room air    PHYSICAL EXAM:        LABS:  CBC-                        11.5   9.56  )-----------( 352      ( 11 Dec 2022 05:23 )             36.2     BMP/CMP-  11 Dec 2022 05:23    143    |  105    |  11     ----------------------------<  98     4.2     |  24     |  0.7      Ca    9.1        11 Dec 2022 05:23    TPro  6.4    /  Alb  4.3    /  TBili  <0.2   /  DBili  x      /  AST  15     /  ALT  15     /  AlkPhos  51     11 Dec 2022 05:23    Coagulation Studies-    Endocrine Panel-  Calcium, Total Serum: 9.1 mg/dL (12-11 @ 05:23)              RADIOLOGY & ADDITIONAL STUDIES:   ENT DAILY PROGRESS NOTE    Pt is a 21yo Female with PMH of IBS who presents to [facility] ED, transferred North  for R ear swelling/redness for more than 1 week- ENT c/s for evaluation of  Right sided perichondritis s/p I&D by [facility] ED. Patient seen and examined at bedside. Patient reports mild improvement with antibiotics and pain management. She reports she think her swelling has mild improvement; however, she states she notices clearish drainage from piercing site. Denies any fever, chills, SOB/difficulty breathing,  tinnitus, otorrhea decreased hearing. No acute events overnight.     REVIEW OF SYSTEMS   [x] A ten-point review of systems was otherwise negative except as noted.    Allergies    No Known Allergies    Intolerances        MEDICATIONS:  acetaminophen     Tablet .. 650 milliGRAM(s) Oral every 6 hours PRN  chlorhexidine 2% Cloths 1 Application(s) Topical <User Schedule>  influenza   Vaccine 0.5 milliLiter(s) IntraMuscular once  ketorolac   Injectable 30 milliGRAM(s) IV Push once  ketorolac   Injectable 15 milliGRAM(s) IV Push every 6 hours PRN  levoFLOXacin IVPB 750 milliGRAM(s) IV Intermittent every 24 hours  ondansetron Injectable 4 milliGRAM(s) IV Push every 6 hours PRN      Vital Signs Last 24 Hrs  T(C): 36.8 (10 Dec 2022 23:05), Max: 36.9 (10 Dec 2022 22:16)  T(F): 98.3 (10 Dec 2022 23:05), Max: 98.5 (10 Dec 2022 22:16)  HR: 58 (10 Dec 2022 23:05) (58 - 76)  BP: 122/90 (10 Dec 2022 23:05) (111/59 - 122/90)  RR: 18 (10 Dec 2022 23:05) (16 - 18)  SpO2: 99% (10 Dec 2022 23:05) (96% - 99%)    Parameters below as of 10 Dec 2022 22:16  Patient On (Oxygen Delivery Method): room air    PHYSICAL EXAM:  GEN: Well-developed, well-nourished. NAD, awake and alert. No drooling or pooling of secretions. No stridor or stertor. Good vocal quality, no hoarseness.   SKIN: Good color, non diaphoretic.  HEENT: NC/AT; EARS: RIGHT: Edematous/erythematous Right helix extending to antihelix, noted with dried scabbing with slight clear drainage from piercing site at antihelix (location of piercing), firm, no area of fluctuance palpated, redness noted along dangelo with piercing noted, +pre-auricular tenderness post auricular tenderness, EAC with edema/erythema, partial visualization of TM, mild mastoid tenderness.   LEFT: multiple piercing along left external ear, wnl   Oral mucosa prink and moist. No erythema or edema noted to buccal mucosa, tongue, FOM, uvula or posterior oropharynx. Uvula midline.   NECK: Traceha midline, Neck supple, no TTP to B/L lateral neck, no cervical LAD.  RESP: No dyspnea, non-labored breathing. No use of accessory muscles.   CARDIO: +S1/S2  ABDO: Soft, NT.          LABS:  CBC-                        11.5   9.56  )-----------( 352      ( 11 Dec 2022 05:23 )             36.2     BMP/CMP-  11 Dec 2022 05:23    143    |  105    |  11     ----------------------------<  98     4.2     |  24     |  0.7      Ca    9.1        11 Dec 2022 05:23    TPro  6.4    /  Alb  4.3    /  TBili  <0.2   /  DBili  x      /  AST  15     /  ALT  15     /  AlkPhos  51     11 Dec 2022 05:23    Coagulation Studies-    Endocrine Panel-  Calcium, Total Serum: 9.1 mg/dL (12-11 @ 05:23)              RADIOLOGY & ADDITIONAL STUDIES:

## 2022-12-11 NOTE — CONSULT NOTE ADULT - ASSESSMENT
22-year-old female with pmhx of IBS presenting to the ED for evaluation of right ear infection x1 week.  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 22-year-old female with pmhx of IBS presenting to the ED for evaluation of right ear infection x1 week.  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    IMPRESSION;  Right ear with abscess with surrounding cellulitis  No otitis externa  CT no abscess    RECOMMENDATIONS;  Sx debridement ( had an inadequate one at the [facility] ER )  See no role for high dosis of steroids  Clindamycin 900 mg iv q8h  Vancomycin 1250 mg iv q12h  Cefepime 2 gm iv q12h  Post debridement d/c on po Bactrim 2 DS tablets q12h and po Doxycycline 100 mg q12h till 12/22

## 2022-12-11 NOTE — CONSULT NOTE ADULT - ASSESSMENT
22-year-old female with pmhx of IBS,  s/p piercing  R ear on 11/18  and removal on 12/6 present with 1x week of right ear infection.     #Right ear perichondritis   - no sepsis   -CT temp bone showing mild subcutaneous soft tissue swelling involving the right periauricular soft tissues, No CT evidence of mastoiditis  -Attempted I&D at [facility], pt unable to tolerate procedure well despite nerve block performed   -NPO after midnight at this time -> plan for I&D to R ear in AM with BURN in OR   -pain meds prn   -per ID -> IV abx and otic medication       Family discussion: Grandfather and Father (Doyle) at bedside; Plan of care discussed with patient and family, aware and agreeable

## 2022-12-11 NOTE — PROGRESS NOTE ADULT - ASSESSMENT
22-year-old female with pmhx of IBS,  s/p piercing  R ear on 11/18  and removal on 12/6 present with 1x week of right ear infection.     #Right ear perichondritis   - no sepsis POA  -CT temp bone showing mild subcutaneous soft tissue swelling involving the right   periauricular soft tissues, No CT evidence of mastoiditis  -f/u BCx  -NPO after midnight for debridement with burn in AM; preop labs at 8pm  -per ID:  Clindamycin 900 mg iv q8h  Vancomycin 1250 mg iv q12h  Cefepime 2 gm iv q12h  Post debridement d/c on po Bactrim 2 DS tablets q12h and po Doxycycline 100 mg q12h till 12/22    -pain meds prn   -monitor for fever   -trend wbc   -Cipro drops 5gtt BID to RIGHT ear for 7 days  -Decadron 6 q 8h for 3 doses     #IBS  -not on home meds  - not in flare  - outpatient gi eval    # Microcytic anemia  - currently 11.5   -ferritin pending   - no signs of bleeding  - monitor cbc  - active type and screen     # DVT PPX: not indicated  # GI PPX: not indicated  # Diet: regular; NPO after midnight    #Progress Note Handoff  Pending (specify):  debridement with burn tomorrow; abx; clinical improvement  Family discussion: Grandfather and Father (Doyle) at bedside; Plan of care discussed with patient and family, aware and agreeable   Disposition: Acute

## 2022-12-11 NOTE — PROGRESS NOTE ADULT - ASSESSMENT
Pt is a 21yo Female with PMH of IBS who presents to [facility] ED, transferred [place]  for R ear swelling/redness for more than 1 week- ENT c/s for evaluation of  Right sided perichondritis s/p I&D by [facility] ED.     A) Perichondritis with OE     Plan   - Continue IV abx per ID   - F/u ID c/s recommendation   - Ciprodex drops 5gtt BID to RIGHT ear for 7 days  - Start Decadron 6 q 8h for 3 doses    - Analgesics for pain control prn   - f/u AM labs   - Monitor VS   - Continue management per primary team   - Will follow  Pt is a 23yo Female with PMH of IBS who presents to [facility] ED, transferred North  for R ear swelling/redness for more than 1 week- ENT c/s for evaluation of  Right sided perichondritis s/p I&D by [facility] ED.     A) Perichondritis with mild OE     Plan   - Continue IV abx per ID   - F/u ID c/s recommendation   - Ciprodex drops 5gtt BID to RIGHT ear for 7 days  - Consider Decadron 6 q 8h for 2 doses   - Right ear drainage ( piercing site) culture    - Analgesics for pain control prn   - f/u AM labs   - Monitor VS   - Continue management per primary team   - Will follow  Pt is a 21yo Female with PMH of IBS who presents to [facility] ED, transferred North  for R ear swelling/redness for more than 1 week- ENT c/s for evaluation of  Right sided perichondritis s/p I&D by [facility] ED.     A) Perichondritis with mild OE     Plan   - Continue IV abx per ID   - F/u ID c/s recommendation   - Ciprodex drops 5gtt BID to RIGHT ear for 7 days  - Consider Decadron 6 q 8h for 2 doses   - Right ear drainage ( piercing site) culture    - Analgesics for pain control prn   - f/u AM labs   - Monitor VS   - Continue management per primary team   - Will follow     ADDENDUM  Patient seen and examined at bedside with Dr. Lau, Slightly small collection along the ear near remaining earing. Discussed with patient and family at bedside to take a small needle to remove driainage and discussed removing earing anterior to antihelix. Per patients, she is adamantly refusing removing earring due to cost of piercing. She is also refusing bedside FNA and is requesting general anesthesia for I&D. She reports they did it at bedside at [facility] and does not want to do it again     Plan;   - Dr. Lau discussed case with burn for possible I&D of R ear collection and further evaluation  - Continue IV abx per ID   - Continue Cipro drops   - Continue management per primary team  Pt is a 23yo Female with PMH of IBS who presents to [facility] ED, transferred North  for R ear swelling/redness for more than 1 week- ENT c/s for evaluation of  Right sided perichondritis s/p I&D by [facility] ED.     A) Perichondritis with mild OE     Plan   - Continue IV abx per ID   - F/u ID c/s recommendation   - Ciprodex drops 5gtt BID to RIGHT ear for 7 days  - Right ear drainage ( piercing site) culture    - Analgesics for pain control prn   - f/u AM labs   - Monitor VS   - Continue management per primary team   - Will follow     ADDENDUM  Patient seen and examined at bedside with Dr. Lau, Slightly small collection along the ear near remaining earing. Discussed with patient and family at bedside to take a small needle to remove driainage and discussed removing earing anterior to antihelix. Per patients, she is adamantly refusing removing earring due to cost of piercing. She is also refusing bedside FNA and is requesting general anesthesia for I&D. She reports they did it at bedside at [facility] and does not want to do it again     Plan;   - Dr. Lau discussed case with burn for possible I&D of R ear collection and further evaluation  - Continue IV abx per ID   - Continue Cipro drops   - Continue management per primary team  Pt is a 23yo Female with PMH of IBS who presents to [facility] ED, transferred North  for R ear swelling/redness for more than 1 week- ENT c/s for evaluation of  Right sided perichondritis s/p I&D by [facility] ED.     A) Perichondritis with mild OE     Plan   - Continue IV abx per ID   - F/u ID c/s recommendation   - Right ear drainage ( piercing site) culture    - Analgesics for pain control prn   - f/u AM labs   - Monitor VS   - Continue management per primary team   - Will follow     ADDENDUM  Patient seen and examined at bedside with Dr. Lau, Slightly small collection along the ear near remaining earing. Discussed with patient and family at bedside to take a small needle to remove driainage and discussed removing earing anterior to antihelix. Per patients, she is adamantly refusing removing earring due to cost of piercing. She is also refusing bedside FNA and is requesting general anesthesia for I&D. She reports they did it at bedside at [facility] and does not want to do it again     Plan;   - Dr. Lau discussed case with burn for possible I&D of R ear collection and further evaluation  - Continue IV abx per ID   - Continue Cipro drops   - Continue management per primary team

## 2022-12-11 NOTE — CONSULT NOTE ADULT - REASON FOR ADMISSION
R ear perichondritis s/p piercing 11/18

## 2022-12-11 NOTE — PROGRESS NOTE ADULT - SUBJECTIVE AND OBJECTIVE BOX
ROSIE KAPADIA  M3--T ([facility] M[facility])      Patient is a 22y old  Female who presents with a chief complaint of R ear perichondritis s/p piercing 11/18 (11 Dec 2022 07:02)        Interval events:  Patient seen and examined at bedside. Says pain in right ear is 7/10, used to be "a million out of 10". Refusing bedside I/D, wants general anesthesia for debridement. No fevers.     -PMHx: IBS (irritable bowel syndrome)      -PSHx:        REVIEW OF SYSTEMS:  CONSTITUTIONAL: as above   RESPIRATORY: No cough, wheezing, chills or hemoptysis; No shortness of breath  CARDIOVASCULAR: No chest pain, palpitations, dizziness, or leg swelling  GASTROINTESTINAL: No abdominal or epigastric pain. No nausea, vomiting, or hematemesis; No diarrhea or constipation. No melena or hematochezia.  NEUROLOGICAL: No headaches  LYMPH NODES: No enlarged glands  MUSCULOSKELETAL: No joint pain or swelling; No muscle, back, or extremity pain      T(C): , Max: 36.9 (12-10-22 @ 22:16)  HR: 55 (12-11-22 @ 08:00)  BP: 105/52 (12-11-22 @ 08:00)  RR: 18 (12-11-22 @ 08:00)  SpO2: 99% (12-10-22 @ 23:05)  CAPILLARY BLOOD GLUCOSE          PHYSICAL EXAM:  GENERAL: NAD, lying in bed comfortably  HEAD:  Atraumatic, Normocephalic  EYES: EOMI, PERRLA, conjunctiva and sclera clear  ENT: right ear edematous/erythematous Right helix extending to antihelix, noted with dried scabbing with slight clear drainage from piercing site at antihelix (location of piercing), firm, no area of fluctuance palpated, redness noted along dangelo with piercing noted, +pre-auricular tenderness post auricular tenderness, EAC with edema/erythema, partial visualization of TM, mild mastoid tenderness.    NECK: Supple, No JVD  CHEST/LUNG: Clear to auscultation bilaterally; No rales, rhonchi, wheezing, or rubs. Unlabored respirations  HEART: Regular rate and rhythm; No murmurs, rubs, or gallops  ABDOMEN: Bowel sounds present; Soft, Nontender, Nondistended. No hepatomegaly  EXTREMITIES:  2+ Peripheral Pulses, brisk capillary refill. No clubbing, cyanosis, or edema  NERVOUS SYSTEM:  Alert & Oriented X3, speech clear. No deficits   MSK: FROM all 4 extremities, full and equal strength  SKIN: No rashes or lesions    LABS:          11.5  9.56  )-------(352          36.2  N=60.6  L=26.4  MCV=69.5    143|105|11  ------------------<98  4.2|24|0.7  eGFR:--  Ca:9.1            Microbiology:      RADIOLOGY & ADDITIONAL TESTS:  < from: CT Temporal Bones w/ IV Cont (12.10.22 @ 01:58) >    IMPRESSION:  Significant swelling of the right auricular and periauricular soft   tissues consistent with otitis externa.    < end of copied text >        Medications:  acetaminophen     Tablet .. 650 milliGRAM(s) Oral every 6 hours PRN  cefepime   IVPB 2000 milliGRAM(s) IV Intermittent every 12 hours  chlorhexidine 2% Cloths 1 Application(s) Topical <User Schedule>  ciprofloxacin  0.3% Otic Solution 3 Drop(s) Right Ear every 12 hours  clindamycin IVPB      clindamycin IVPB 900 milliGRAM(s) IV Intermittent every 8 hours  dexAMETHasone     Tablet 6 milliGRAM(s) Oral every 8 hours  influenza   Vaccine 0.5 milliLiter(s) IntraMuscular once  ketorolac   Injectable 30 milliGRAM(s) IV Push once  ketorolac   Injectable 15 milliGRAM(s) IV Push every 6 hours PRN  ondansetron Injectable 4 milliGRAM(s) IV Push every 6 hours PRN  vancomycin  IVPB 1250 milliGRAM(s) IV Intermittent every 12 hours

## 2022-12-12 ENCOUNTER — TRANSCRIPTION ENCOUNTER (OUTPATIENT)
Age: 22
End: 2022-12-12

## 2022-12-12 LAB
ANION GAP SERPL CALC-SCNC: 11 MMOL/L — SIGNIFICANT CHANGE UP (ref 7–14)
APTT BLD: 26.6 SEC — LOW (ref 27–39.2)
BUN SERPL-MCNC: 14 MG/DL — SIGNIFICANT CHANGE UP (ref 10–20)
CALCIUM SERPL-MCNC: 8.8 MG/DL — SIGNIFICANT CHANGE UP (ref 8.4–10.5)
CHLORIDE SERPL-SCNC: 106 MMOL/L — SIGNIFICANT CHANGE UP (ref 98–110)
CO2 SERPL-SCNC: 24 MMOL/L — SIGNIFICANT CHANGE UP (ref 17–32)
CREAT SERPL-MCNC: 0.7 MG/DL — SIGNIFICANT CHANGE UP (ref 0.7–1.5)
EGFR: 127 ML/MIN/1.73M2 — SIGNIFICANT CHANGE UP
FERRITIN SERPL-MCNC: 45 NG/ML — SIGNIFICANT CHANGE UP (ref 15–150)
GLUCOSE SERPL-MCNC: 99 MG/DL — SIGNIFICANT CHANGE UP (ref 70–99)
HCG UR QL: NEGATIVE — SIGNIFICANT CHANGE UP
HCT VFR BLD CALC: 37.4 % — SIGNIFICANT CHANGE UP (ref 37–47)
HGB BLD-MCNC: 11.5 G/DL — LOW (ref 12–16)
INR BLD: 1.03 RATIO — SIGNIFICANT CHANGE UP (ref 0.65–1.3)
MCHC RBC-ENTMCNC: 21.7 PG — LOW (ref 27–31)
MCHC RBC-ENTMCNC: 30.7 G/DL — LOW (ref 32–37)
MCV RBC AUTO: 70.6 FL — LOW (ref 81–99)
NRBC # BLD: 0 /100 WBCS — SIGNIFICANT CHANGE UP (ref 0–0)
PLATELET # BLD AUTO: 337 K/UL — SIGNIFICANT CHANGE UP (ref 130–400)
POTASSIUM SERPL-MCNC: 4.1 MMOL/L — SIGNIFICANT CHANGE UP (ref 3.5–5)
POTASSIUM SERPL-SCNC: 4.1 MMOL/L — SIGNIFICANT CHANGE UP (ref 3.5–5)
PROTHROM AB SERPL-ACNC: 11.7 SEC — SIGNIFICANT CHANGE UP (ref 9.95–12.87)
RBC # BLD: 5.3 M/UL — SIGNIFICANT CHANGE UP (ref 4.2–5.4)
RBC # FLD: 14.7 % — HIGH (ref 11.5–14.5)
SODIUM SERPL-SCNC: 141 MMOL/L — SIGNIFICANT CHANGE UP (ref 135–146)
WBC # BLD: 10.28 K/UL — SIGNIFICANT CHANGE UP (ref 4.8–10.8)
WBC # FLD AUTO: 10.28 K/UL — SIGNIFICANT CHANGE UP (ref 4.8–10.8)

## 2022-12-12 PROCEDURE — 99231 SBSQ HOSP IP/OBS SF/LOW 25: CPT

## 2022-12-12 PROCEDURE — 93010 ELECTROCARDIOGRAM REPORT: CPT

## 2022-12-12 PROCEDURE — 99232 SBSQ HOSP IP/OBS MODERATE 35: CPT

## 2022-12-12 PROCEDURE — 71045 X-RAY EXAM CHEST 1 VIEW: CPT | Mod: 26

## 2022-12-12 RX ORDER — GUAIFENESIN/DEXTROMETHORPHAN 600MG-30MG
10 TABLET, EXTENDED RELEASE 12 HR ORAL EVERY 6 HOURS
Refills: 0 | Status: DISCONTINUED | OUTPATIENT
Start: 2022-12-12 | End: 2022-12-13

## 2022-12-12 RX ADMIN — Medication 166.67 MILLIGRAM(S): at 17:41

## 2022-12-12 RX ADMIN — Medication 10 MILLILITER(S): at 21:03

## 2022-12-12 RX ADMIN — Medication 100 MILLIGRAM(S): at 05:13

## 2022-12-12 RX ADMIN — Medication 166.67 MILLIGRAM(S): at 05:13

## 2022-12-12 RX ADMIN — CEFEPIME 100 MILLIGRAM(S): 1 INJECTION, POWDER, FOR SOLUTION INTRAMUSCULAR; INTRAVENOUS at 05:13

## 2022-12-12 RX ADMIN — Medication 100 MILLIGRAM(S): at 21:03

## 2022-12-12 RX ADMIN — Medication 100 MILLIGRAM(S): at 14:13

## 2022-12-12 RX ADMIN — CEFEPIME 100 MILLIGRAM(S): 1 INJECTION, POWDER, FOR SOLUTION INTRAMUSCULAR; INTRAVENOUS at 17:10

## 2022-12-12 NOTE — PROGRESS NOTE ADULT - ASSESSMENT
22-year-old female with pmhx of IBS,  s/p piercing  R ear on 11/18  and removal on 12/6 present with 1x week of right ear infection.     #Right ear perichondritis   - no sepsis   -CT temp bone showing mild subcutaneous soft tissue swelling involving the right periauricular soft tissues, No CT evidence of mastoiditis  -Attempted I&D at [facility], pt unable to tolerate procedure well despite nerve block performed   -NPO after midnight tonight -> plan for I&D to R ear tomorrow with BURN in OR, currently scheduled for 2:30  -pain meds prn   -per ID -> IV abx and otic medication

## 2022-12-12 NOTE — PROGRESS NOTE ADULT - ASSESSMENT
22-year-old female with pmhx of IBS,  s/p piercing  R ear on 11/18  and removal on 12/6 present with 1x week of right ear infection.     #Right ear perichondritis   - no sepsis POA  -CT temp bone showing mild subcutaneous soft tissue swelling involving the right   periauricular soft tissues, No CT evidence of mastoiditis  -f/u BCx  -NPO after midnight for debridement with burn in AM; preop labs at 8pm  -per ID:  Clindamycin 900 mg iv q8h  Vancomycin 1250 mg iv q12h  Cefepime 2 gm iv q12h  Post debridement d/c on po Bactrim 2 DS tablets q12h and po Doxycycline 100 mg q12h till 12/22  Right ear specimen culture pending  Burn recs appreciated - possible procedure paulo - f/u in AM  The patient tolerated procedure well. need for surgery was discussed patient opts to not wait for surgery today given continued improvement  She was placed  on the schedule for tomorrow pending reevaluation  -pain meds prn   -monitor for fever   -trend wbc   -Cipro drops 5gtt BID to RIGHT ear for 7 days - d/c by ENT  -Decadron 6 q 8h for 3 doses - discontinued     #IBS  -not on home meds  - not in flare  - outpatient gi eval    # Microcytic anemia  - currently 11.5   -ferritin 45  - no signs of bleeding  - monitor cbc  - active type and screen     # DVT PPX: not indicated  # GI PPX: not indicated  # Diet: regular; NPO after midnight    #Progress Note Handoff  Pending (specify):  abx; clinical improvement, f/u burn, culture right ear  Family discussion: Grandfather and Father (Doyle) at bedside; Plan of care discussed with patient and family, aware and agreeable   Disposition: Acute

## 2022-12-12 NOTE — PROGRESS NOTE ADULT - SUBJECTIVE AND OBJECTIVE BOX
HPI:  A 22-year-old female with pmhx of IBS presenting to the ED for evaluation of right ear infection x1 week.  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 Pt reports on Tuesday her dog accidentally pressed on her ear. PT states on Tuesday she removed her piercing. Pt states has been cleaning her piercing with H2o [place] spray. Pt reports clear discharge. Pt reports has been having more pain and swelling every day. Last piercing on her right ear was 6 months ago. Pt denies fever, chills, chest pain, shortness of breath, burning with urination. Pt reports chronic diarrhea from IBS. Pt had I& D done in ER.     seen on AM rounds today with Dr. Jones. No overnight events. She notes improvement in her pain and tenderness. afebrile.     ICU Vital Signs Last 24 Hrs  T(F): 98.1 (12 Dec 2022 13:41), Max: 98.7 (11 Dec 2022 17:04)  HR: 60 (12 Dec 2022 13:41) (58 - 65)  BP: 103/51 (12 Dec 2022 13:41) (92/50 - 111/57)  RR: 18 (12 Dec 2022 13:41) (18 - 18)  SpO2: 94% (12 Dec 2022 12:00) (93% - 97%)    O2 Parameters below as of 12 Dec 2022 00:07  Patient On (Oxygen Delivery Method): room air                          11.5   10.28 )-----------( 337      ( 12 Dec 2022 02:06 )             37.4       Culture - Blood (collected 10 Dec 2022 07:48)  Source: .Blood Blood-Peripheral  Preliminary Report (11 Dec 2022 22:02):    No growth to date.    Culture - Blood (collected 10 Dec 2022 07:48)  Source: .Blood Blood-Peripheral  Preliminary Report (11 Dec 2022 22:02):    No growth to date.    PE:   Gen: NAD, resting comfortably in bed. mother at beside.   neuro: AAOX3, speaking clear and fluent sentences.   chest: no cardiopulmonary compromise, no increased work of effort  wound: right ear abscess to the antihelix. cleaned at bedside today with attempt to drain however no expressible fluid.         HPI:  A 22-year-old female with pmhx of IBS presenting to the ED for evaluation of right ear infection x1 week.  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 Pt reports on Tuesday her dog accidentally pressed on her ear. PT states on Tuesday she removed her piercing. Pt states has been cleaning her piercing with H2o [place] spray. Pt reports clear discharge. Pt reports has been having more pain and swelling every day. Last piercing on her right ear was 6 months ago. Pt denies fever, chills, chest pain, shortness of breath, burning with urination. Pt reports chronic diarrhea from IBS. Pt had I& D done in ER.     seen on AM rounds today with Dr. Jones. No overnight events. She notes improvement in her pain and tenderness. afebrile.     ICU Vital Signs Last 24 Hrs  T(F): 98.1 (12 Dec 2022 13:41), Max: 98.7 (11 Dec 2022 17:04)  HR: 60 (12 Dec 2022 13:41) (58 - 65)  BP: 103/51 (12 Dec 2022 13:41) (92/50 - 111/57)  RR: 18 (12 Dec 2022 13:41) (18 - 18)  SpO2: 94% (12 Dec 2022 12:00) (93% - 97%)    O2 Parameters below as of 12 Dec 2022 00:07  Patient On (Oxygen Delivery Method): room air                          11.5   10.28 )-----------( 337      ( 12 Dec 2022 02:06 )             37.4       Culture - Blood (collected 10 Dec 2022 07:48)  Source: .Blood Blood-Peripheral  Preliminary Report (11 Dec 2022 22:02):    No growth to date.    Culture - Blood (collected 10 Dec 2022 07:48)  Source: .Blood Blood-Peripheral  Preliminary Report (11 Dec 2022 22:02):    No growth to date.    PE:   Gen: NAD, resting comfortably in bed. mother at beside.   neuro: AAOX3, speaking clear and fluent sentences.   chest: no cardiopulmonary compromise, no increased work of effort  wound: right ear abscess to the antihelix. debrided and cleaned cleaned at bedside today   area of fluctuance - no fluid expressed - thick yellow  necrotic material removed with forceps    dressing placed

## 2022-12-12 NOTE — PROGRESS NOTE ADULT - NS ATTEND AMEND GEN_ALL_CORE FT
As above patient seen on daily rounds with team  Patient's mother is at bedside  Patient reports decreased pain ; again adamantly refuses bedside treatment with local anesthesia   Currently on multiple IV antibiotics  Also complaining of distorted taste    Exam:  Right air mild erythema approximately 1 cm area of crusting on the ant upper antihelix -crusting debrided to reveal ulcerated open wound with yellow necrotic material and slightly fluctuant base however no with no fluid was expressed and patient did not tolerate significant manipulation due to pain  Necrotic material was removed using forceps; site was cleaned and a moist dressing and Xeroform was applied    A/P  Right external ear soft tissue infection/ perichondritis with abscess at site of 1-month old piercing   The patient tolerated this well need for surgery was discussed patient opts to not wait for surgery today given continued improvement  She was placed  on the schedule for tomorrow pending reevaluation  Continue IV abx per ID   Taste distortion likely due to abx

## 2022-12-12 NOTE — PROGRESS NOTE ADULT - SUBJECTIVE AND OBJECTIVE BOX
MARCO ANTONIOROSIE MONTANO  22y, Female  Allergy: No Known Allergies    Hospital Day: 2d    Patient seen and examined earlier today.     PMH/PSH:  PAST MEDICAL & SURGICAL HISTORY:  IBS (irritable bowel syndrome)          LAST 24-Hr EVENTS:    VITALS:  T(F): 97.2 (12-12-22 @ 16:51), Max: 98.7 (12-12-22 @ 00:07)  HR: 66 (12-12-22 @ 16:51)  BP: 93/52 (12-12-22 @ 16:51) (92/50 - 111/57)  RR: 18 (12-12-22 @ 16:51)  SpO2: 94% (12-12-22 @ 12:00)          TESTS & MEASUREMENTS:  Weight/BMI      12-12-22 @ 07:01  -  12-12-22 @ 17:16  --------------------------------------------------------  IN: 350 mL / OUT: 0 mL / NET: 350 mL                            11.5   10.28 )-----------( 337      ( 12 Dec 2022 02:06 )             37.4     PT/INR - ( 12 Dec 2022 02:06 )   PT: 11.70 sec;   INR: 1.03 ratio         PTT - ( 12 Dec 2022 02:06 )  PTT:26.6 sec  INR: 1.03 ratio (12-12-22 @ 02:06)    12-12    141  |  106  |  14  ----------------------------<  99  4.1   |  24  |  0.7    Ca    8.8      12 Dec 2022 02:06    TPro  6.4  /  Alb  4.3  /  TBili  <0.2  /  DBili  x   /  AST  15  /  ALT  15  /  AlkPhos  51  12-11    LIVER FUNCTIONS - ( 11 Dec 2022 05:23 )  Alb: 4.3 g/dL / Pro: 6.4 g/dL / ALK PHOS: 51 U/L / ALT: 15 U/L / AST: 15 U/L / GGT: x                 Culture - Blood (collected 12-10-22 @ 07:48)  Source: .Blood Blood-Peripheral  Preliminary Report (12-11-22 @ 22:02):    No growth to date.    Culture - Blood (collected 12-10-22 @ 07:48)  Source: .Blood Blood-Peripheral  Preliminary Report (12-11-22 @ 22:02):    No growth to date.            Ferritin, Serum: 45 ng/mL (12-11-22 @ 05:23)      COVID-19 PCR: NotDetec (12-10-22 @ 05:08)                RADIOLOGY, ECG, & ADDITIONAL TESTS:      RECENT DIAGNOSTIC ORDERS:  Vancomycin Level, Trough: STAT (12-12-22 @ 17:15)  Diet, NPO after Midnight:      NPO Start Date: 12-Dec-2022,   NPO Start Time: 23:59  Except Medications (12-12-22 @ 16:21)  Diet, Regular (12-12-22 @ 14:13)      MEDICATIONS:  MEDICATIONS  (STANDING):  cefepime   IVPB 2000 milliGRAM(s) IV Intermittent every 12 hours  chlorhexidine 2% Cloths 1 Application(s) Topical <User Schedule>  clindamycin IVPB      clindamycin IVPB 900 milliGRAM(s) IV Intermittent every 8 hours  influenza   Vaccine 0.5 milliLiter(s) IntraMuscular once  vancomycin  IVPB 1250 milliGRAM(s) IV Intermittent every 12 hours    MEDICATIONS  (PRN):  acetaminophen     Tablet .. 650 milliGRAM(s) Oral every 6 hours PRN Temp greater or equal to 38C (100.4F), Mild Pain (1 - 3)  guaiFENesin Oral Liquid (Sugar-Free) 200 milliGRAM(s) Oral every 6 hours PRN Cough  ketorolac   Injectable 15 milliGRAM(s) IV Push every 6 hours PRN Moderate Pain (4 - 6)  ondansetron Injectable 4 milliGRAM(s) IV Push every 6 hours PRN Nausea and/or Vomiting      HOME MEDICATIONS:  cephalexin 500 mg oral tablet (12-10)  ciprofloxacin 500 mg oral tablet (12-10)      PHYSICAL EXAM:    Constitutional: Not in acute distress  SKIN: No rashes or lesions  HEENT: Atraumatic. Normocephalic. Anicteric - right ear edematous/erythematous Right helix extending to antihelix, noted with dried scabbing with slight clear drainage from piercing site at antihelix (location of piercing), firm, no area of fluctuance palpated, redness noted along dangelo with piercing noted, +pre-auricular tenderness post auricular tenderness, EAC with edema/erythema, partial visualization of TM, mild mastoid tenderness.    PULMONARY: Clear Auscultation bilateraly. non labored respirations.   Cardiovascular: Normal S1, S2. Regular rate and rhythm. No murmurs.  ABDOMEN: Soft, Nontender, Nondistended; Bowel sounds are present  EXTREMITIES:  Non edematous, non cyanotic  NEUROLOGIC:  Alert & oriented x 3, No motor deficit.  PSYCH: normal affect

## 2022-12-13 ENCOUNTER — TRANSCRIPTION ENCOUNTER (OUTPATIENT)
Age: 22
End: 2022-12-13

## 2022-12-13 LAB
-  AMIKACIN: SIGNIFICANT CHANGE UP
-  AZTREONAM: SIGNIFICANT CHANGE UP
-  CEFEPIME: SIGNIFICANT CHANGE UP
-  CEFTAZIDIME: SIGNIFICANT CHANGE UP
-  CIPROFLOXACIN: SIGNIFICANT CHANGE UP
-  GENTAMICIN: SIGNIFICANT CHANGE UP
-  IMIPENEM: SIGNIFICANT CHANGE UP
-  LEVOFLOXACIN: SIGNIFICANT CHANGE UP
-  MEROPENEM: SIGNIFICANT CHANGE UP
-  PIPERACILLIN/TAZOBACTAM: SIGNIFICANT CHANGE UP
-  TOBRAMYCIN: SIGNIFICANT CHANGE UP
CULTURE RESULTS: SIGNIFICANT CHANGE UP
METHOD TYPE: SIGNIFICANT CHANGE UP
ORGANISM # SPEC MICROSCOPIC CNT: SIGNIFICANT CHANGE UP
ORGANISM # SPEC MICROSCOPIC CNT: SIGNIFICANT CHANGE UP
SPECIMEN SOURCE: SIGNIFICANT CHANGE UP

## 2022-12-13 PROCEDURE — 69000 DRG XTRNL EAR ABSC/HEM SMPL: CPT

## 2022-12-13 PROCEDURE — 99232 SBSQ HOSP IP/OBS MODERATE 35: CPT

## 2022-12-13 RX ORDER — SODIUM CHLORIDE 9 MG/ML
1000 INJECTION, SOLUTION INTRAVENOUS
Refills: 0 | Status: DISCONTINUED | OUTPATIENT
Start: 2022-12-13 | End: 2022-12-13

## 2022-12-13 RX ORDER — ONDANSETRON 8 MG/1
4 TABLET, FILM COATED ORAL EVERY 6 HOURS
Refills: 0 | Status: DISCONTINUED | OUTPATIENT
Start: 2022-12-13 | End: 2022-12-14

## 2022-12-13 RX ORDER — LACTOBACILLUS ACIDOPHILUS 100MM CELL
1 CAPSULE ORAL DAILY
Refills: 0 | Status: DISCONTINUED | OUTPATIENT
Start: 2022-12-13 | End: 2022-12-13

## 2022-12-13 RX ORDER — HYDROMORPHONE HYDROCHLORIDE 2 MG/ML
0.5 INJECTION INTRAMUSCULAR; INTRAVENOUS; SUBCUTANEOUS
Refills: 0 | Status: DISCONTINUED | OUTPATIENT
Start: 2022-12-13 | End: 2022-12-13

## 2022-12-13 RX ORDER — MORPHINE SULFATE 50 MG/1
2 CAPSULE, EXTENDED RELEASE ORAL
Refills: 0 | Status: DISCONTINUED | OUTPATIENT
Start: 2022-12-13 | End: 2022-12-13

## 2022-12-13 RX ORDER — ACETAMINOPHEN 500 MG
650 TABLET ORAL EVERY 6 HOURS
Refills: 0 | Status: DISCONTINUED | OUTPATIENT
Start: 2022-12-13 | End: 2022-12-14

## 2022-12-13 RX ORDER — LEVOFLOXACIN 5 MG/ML
750 INJECTION, SOLUTION INTRAVENOUS EVERY 24 HOURS
Refills: 0 | Status: DISCONTINUED | OUTPATIENT
Start: 2022-12-13 | End: 2022-12-13

## 2022-12-13 RX ORDER — CHLORHEXIDINE GLUCONATE 213 G/1000ML
1 SOLUTION TOPICAL
Refills: 0 | Status: DISCONTINUED | OUTPATIENT
Start: 2022-12-13 | End: 2022-12-14

## 2022-12-13 RX ORDER — ONDANSETRON 8 MG/1
4 TABLET, FILM COATED ORAL ONCE
Refills: 0 | Status: DISCONTINUED | OUTPATIENT
Start: 2022-12-13 | End: 2022-12-13

## 2022-12-13 RX ORDER — GUAIFENESIN/DEXTROMETHORPHAN 600MG-30MG
10 TABLET, EXTENDED RELEASE 12 HR ORAL EVERY 6 HOURS
Refills: 0 | Status: DISCONTINUED | OUTPATIENT
Start: 2022-12-13 | End: 2022-12-14

## 2022-12-13 RX ORDER — KETOROLAC TROMETHAMINE 30 MG/ML
15 SYRINGE (ML) INJECTION EVERY 6 HOURS
Refills: 0 | Status: DISCONTINUED | OUTPATIENT
Start: 2022-12-13 | End: 2022-12-14

## 2022-12-13 RX ORDER — LACTOBACILLUS ACIDOPHILUS 100MM CELL
1 CAPSULE ORAL DAILY
Refills: 0 | Status: DISCONTINUED | OUTPATIENT
Start: 2022-12-13 | End: 2022-12-14

## 2022-12-13 RX ADMIN — Medication 15 MILLIGRAM(S): at 20:00

## 2022-12-13 RX ADMIN — Medication 15 MILLIGRAM(S): at 19:23

## 2022-12-13 RX ADMIN — ONDANSETRON 4 MILLIGRAM(S): 8 TABLET, FILM COATED ORAL at 08:06

## 2022-12-13 RX ADMIN — Medication 166.67 MILLIGRAM(S): at 05:01

## 2022-12-13 RX ADMIN — Medication 100 MILLIGRAM(S): at 13:39

## 2022-12-13 RX ADMIN — CEFEPIME 100 MILLIGRAM(S): 1 INJECTION, POWDER, FOR SOLUTION INTRAMUSCULAR; INTRAVENOUS at 05:01

## 2022-12-13 RX ADMIN — Medication 100 MILLIGRAM(S): at 05:01

## 2022-12-13 RX ADMIN — Medication 1 TABLET(S): at 12:13

## 2022-12-13 RX ADMIN — Medication 10 MILLILITER(S): at 05:02

## 2022-12-13 RX ADMIN — HYDROMORPHONE HYDROCHLORIDE 0.5 MILLIGRAM(S): 2 INJECTION INTRAMUSCULAR; INTRAVENOUS; SUBCUTANEOUS at 17:36

## 2022-12-13 NOTE — CHART NOTE - NSCHARTNOTEFT_GEN_A_CORE
PACU ANESTHESIA ADMISSION NOTE      Procedure: Selective debridement  debridement with scalpel righ ear      Post op diagnosis:  Abscess        ____  Intubated  TV:______       Rate: ______      FiO2: ______    _x___  Patent Airway    _x___  Full return of protective reflexes    _x___  Full recovery from anesthesia / back to baseline status    Vitals  SPO2:-97  HR:-68  RR:-12  B.P:-117/67  TEMP:-98.3    Mental Status:  _x___ Awake   ___x_ Alert   _____ Drowsy   _____ Sedated    Nausea/Vomiting:  _x___  NO       ______Yes,   See Post - Op Orders         Pain Scale (0-10):  __0___    Treatment: _x___ None    __x__ See Post - Op/PCA Orders    Post - Operative Fluids:   ___ Oral   ____x See Post - Op Orders    Plan: Discharge:   ____Home       ___x__Floor     _____Critical Care    _____  Other:_________________    Comments:  Report endorsed to RN in pacu  Vitals stable  No anesthesia issues or complications noted.  Discharge to patient to floor  when criteria met.

## 2022-12-13 NOTE — PROGRESS NOTE ADULT - ASSESSMENT
22-year-old female with pmhx of IBS,  s/p piercing  R ear on 11/18  and removal on 12/6 present with 1x week of right ear infection.     #Right ear perichondritis   - no sepsis POA  -CT temp bone showing mild subcutaneous soft tissue swelling involving the right   periauricular soft tissues, No CT evidence of mastoiditis  -f/u BCx  -NPO since midnight for debridement with burn today AM; preop labs noted   -per ID:  Levaquin to complete course  Post debridement: f/u Right ear specimen culture   f/u Burn for further recs post procedure.  -pain meds prn   -monitor for fever   -trend wbc   -Cipro drops 5gtt BID to RIGHT ear for 7 days - d/c by ENT  -Decadron 6 q 8h for 3 doses - discontinued     #IBS  -not on home meds  - not in flare  - outpatient gi eval    # Microcytic anemia  - currently 11.5   -ferritin 45  - no signs of bleeding  - monitor cbc  - active type and screen     # DVT PPX: not indicated  # GI PPX: not indicated  # Diet: regular; NPO after midnight    #Progress Note Handoff  Pending (specify):  abx; clinical improvement, f/u burn, culture right ear  Family discussion: Grandfather and Father (Doyle) at bedside; Plan of care discussed with patient and family, aware and agreeable   Disposition: Acute  f/u Burn. likely d/c in 24 hours on PO LVQ.

## 2022-12-13 NOTE — PROGRESS NOTE ADULT - TIME BILLING
Total time spent to complete patient's bedside assessment, physical examination, review medical chart including labs & imaging, discuss medical plan of care with housestaff was more than 35 minutes
Counseled patient about diagnostic testing and treatment plan. All questions answered. Abnormal lab/radiographical/microbiological data reviewed.

## 2022-12-13 NOTE — PRE-ANESTHESIA EVALUATION ADULT - NSANTHOSAYNRD_GEN_A_CORE
No. MERLE screening performed.  STOP BANG Legend: 0-2 = LOW Risk; 3-4 = INTERMEDIATE Risk; 5-8 = HIGH Risk

## 2022-12-13 NOTE — PROGRESS NOTE ADULT - ASSESSMENT
· Assessment	  22-year-old female with pmhx of IBS presenting to the ED for evaluation of right ear infection x1 week.  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    IMPRESSION;  Right ear with resolving abscess with resolving surrounding cellulitis  WCx rare P aeruginosa  No otitis externa  CT no abscess    RECOMMENDATIONS;  Sx debridement possibly planned for today  D/c Clindamycin 900 mg iv q8h  D/c Vancomycin 1250 mg iv q12h  Cefepime 2 gm iv q12h> change to po Levoquin 750 mg q24h for 7 mre days  Please do not hesitate to recall ID if any questions arise either through [employer] or through [employer]

## 2022-12-13 NOTE — PROGRESS NOTE ADULT - SUBJECTIVE AND OBJECTIVE BOX
ROSIE KAPADIA  22y, Female    All available historical data reviewed    OVERNIGHT EVENTS:  feels well and has no new complaints  No fevers     ROS:  General: Denies rigors, nightsweats  HEENT: Denies headache, rhinorrhea, sore throat, eye pain  CV: Denies CP, palpitations  PULM: Denies wheezing, hemoptysis  GI: Denies hematemesis, hematochezia, melena  : Denies discharge, hematuria  MSK: Denies arthralgias, myalgias  SKIN: Denies rash, lesions  NEURO: Denies paresthesias, weakness  PSYCH: Denies depression, anxiety    VITALS:  T(F): 98.1, Max: 98.3 (12-12-22 @ 12:00)  HR: 66  BP: 114/73  RR: 18Vital Signs Last 24 Hrs  T(C): 36.7 (13 Dec 2022 08:42), Max: 36.8 (12 Dec 2022 12:00)  T(F): 98.1 (13 Dec 2022 08:42), Max: 98.3 (12 Dec 2022 12:00)  HR: 66 (13 Dec 2022 08:42) (58 - 70)  BP: 114/73 (13 Dec 2022 08:42) (92/50 - 120/58)  BP(mean): --  RR: 18 (13 Dec 2022 08:42) (18 - 18)  SpO2: 94% (12 Dec 2022 12:00) (94% - 94%)        TESTS & MEASUREMENTS:                        11.5   10.28 )-----------( 337      ( 12 Dec 2022 02:06 )             37.4     12-12    141  |  106  |  14  ----------------------------<  99  4.1   |  24  |  0.7    Ca    8.8      12 Dec 2022 02:06          Culture - Other (collected 12-11-22 @ 08:46)  Source: .Other Right Ear piercing  Preliminary Report (12-12-22 @ 21:49):    Rare Pseudomonas aeruginosa    Culture - Blood (collected 12-10-22 @ 07:48)  Source: .Blood Blood-Peripheral  Preliminary Report (12-11-22 @ 22:02):    No growth to date.    Culture - Blood (collected 12-10-22 @ 07:48)  Source: .Blood Blood-Peripheral  Preliminary Report (12-11-22 @ 22:02):    No growth to date.            RADIOLOGY & ADDITIONAL TESTS:  Personal review of radiological diagnostics performed  Echo and EKG results noted when applicable.     MEDICATIONS:  acetaminophen     Tablet .. 650 milliGRAM(s) Oral every 6 hours PRN  cefepime   IVPB 2000 milliGRAM(s) IV Intermittent every 12 hours  chlorhexidine 2% Cloths 1 Application(s) Topical <User Schedule>  clindamycin IVPB      clindamycin IVPB 900 milliGRAM(s) IV Intermittent every 8 hours  guaifenesin/dextromethorphan Oral Liquid 10 milliLiter(s) Oral every 6 hours  influenza   Vaccine 0.5 milliLiter(s) IntraMuscular once  ketorolac   Injectable 15 milliGRAM(s) IV Push every 6 hours PRN  lactobacillus acidophilus 1 Tablet(s) Oral daily  ondansetron Injectable 4 milliGRAM(s) IV Push every 6 hours PRN  vancomycin  IVPB 1250 milliGRAM(s) IV Intermittent every 12 hours      ANTIBIOTICS:  cefepime   IVPB 2000 milliGRAM(s) IV Intermittent every 12 hours  clindamycin IVPB      clindamycin IVPB 900 milliGRAM(s) IV Intermittent every 8 hours  vancomycin  IVPB 1250 milliGRAM(s) IV Intermittent every 12 hours

## 2022-12-13 NOTE — PROGRESS NOTE ADULT - SUBJECTIVE AND OBJECTIVE BOX
S: No new events/complaints    waiting for surgery    All other pertinent ROS negative.      12-12-22 @ 07:01  -  12-13-22 @ 07:00  --------------------------------------------------------  IN: 350 mL / OUT: 0 mL / NET: 350 mL    12-13-22 @ 07:01  -  12-13-22 @ 19:19  --------------------------------------------------------  IN: 50 mL / OUT: 0 mL / NET: 50 mL      Vital Signs Last 24 Hrs  T(C): 36.6 (13 Dec 2022 18:29), Max: 36.9 (13 Dec 2022 16:24)  T(F): 97.9 (13 Dec 2022 18:29), Max: 98.5 (13 Dec 2022 16:24)  HR: 60 (13 Dec 2022 18:29) (57 - 73)  BP: 119/65 (13 Dec 2022 18:29) (99/53 - 124/74)  BP(mean): 85 (13 Dec 2022 18:29) (85 - 85)  RR: 17 (13 Dec 2022 18:29) (14 - 20)  SpO2: 100% (13 Dec 2022 18:29) (94% - 100%)    Parameters below as of 13 Dec 2022 18:29  Patient On (Oxygen Delivery Method): room air      PHYSICAL EXAM:    Constitutional: NAD, awake and alert  HEENT: PERR, EOMI, Normal Hearing, MMM. Right ear cartilage swollen.  Neck: Soft and supple, No LAD, No JVD  Respiratory: Breath sounds are clear bilaterally, No wheezing, rales or rhonchi  Cardiovascular: S1 and S2, regular rate and rhythm, no Murmurs, gallops or rubs  Gastrointestinal: Bowel Sounds present, soft, nontender, nondistended, no guarding, no rebound  Extremities: No peripheral edema      MEDICATIONS:  MEDICATIONS  (STANDING):  chlorhexidine 2% Cloths 1 Application(s) Topical <User Schedule>  guaifenesin/dextromethorphan Oral Liquid 10 milliLiter(s) Oral every 6 hours  influenza   Vaccine 0.5 milliLiter(s) IntraMuscular once  lactobacillus acidophilus 1 Tablet(s) Oral daily  levoFLOXacin  Tablet 750 milliGRAM(s) Oral every 24 hours      LABS: All Labs Reviewed:                        11.5   10.28 )-----------( 337      ( 12 Dec 2022 02:06 )             37.4     12-12    141  |  106  |  14  ----------------------------<  99  4.1   |  24  |  0.7    Ca    8.8      12 Dec 2022 02:06      PT/INR - ( 12 Dec 2022 02:06 )   PT: 11.70 sec;   INR: 1.03 ratio         PTT - ( 12 Dec 2022 02:06 )  PTT:26.6 sec      Blood Culture: 12-11 @ 08:46  Organism Pseudomonas aeruginosa  Gram Stain Blood -- Gram Stain --  Specimen Source .Other Right Ear piercing  Culture-Blood --    12-10 @ 07:48  Organism --  Gram Stain Blood -- Gram Stain --  Specimen Source .Blood Blood-Peripheral  Culture-Blood --        Radiology: reviewed

## 2022-12-14 ENCOUNTER — TRANSCRIPTION ENCOUNTER (OUTPATIENT)
Age: 22
End: 2022-12-14

## 2022-12-14 VITALS
TEMPERATURE: 98 F | RESPIRATION RATE: 18 BRPM | SYSTOLIC BLOOD PRESSURE: 127 MMHG | OXYGEN SATURATION: 95 % | HEART RATE: 64 BPM | DIASTOLIC BLOOD PRESSURE: 66 MMHG

## 2022-12-14 PROCEDURE — 99231 SBSQ HOSP IP/OBS SF/LOW 25: CPT

## 2022-12-14 PROCEDURE — 99239 HOSP IP/OBS DSCHRG MGMT >30: CPT

## 2022-12-14 RX ORDER — ACETAMINOPHEN 500 MG
2 TABLET ORAL
Qty: 0 | Refills: 0 | DISCHARGE
Start: 2022-12-14

## 2022-12-14 RX ORDER — LEVOFLOXACIN 5 MG/ML
1 INJECTION, SOLUTION INTRAVENOUS
Qty: 7 | Refills: 0
Start: 2022-12-14 | End: 2022-12-20

## 2022-12-14 RX ORDER — FLUCONAZOLE 150 MG/1
1 TABLET ORAL
Qty: 3 | Refills: 0
Start: 2022-12-14 | End: 2022-12-16

## 2022-12-14 RX ORDER — FLUCONAZOLE 150 MG/1
3 TABLET ORAL
Qty: 3 | Refills: 0
Start: 2022-12-14 | End: 2022-12-14

## 2022-12-14 RX ADMIN — Medication 15 MILLIGRAM(S): at 03:40

## 2022-12-14 RX ADMIN — Medication 10 MILLILITER(S): at 05:08

## 2022-12-14 RX ADMIN — Medication 15 MILLIGRAM(S): at 04:20

## 2022-12-14 NOTE — PROGRESS NOTE ADULT - SUBJECTIVE AND OBJECTIVE BOX
POD #1 s/p debridement of right ear.    Pt: c/o mild pain during dressing change.  No acute events o/n    Vital Signs Last 24 Hrs  T(C): 36.5 (14 Dec 2022 04:48), Max: 36.8 (13 Dec 2022 17:33)  T(F): 97.7 (14 Dec 2022 04:48), Max: 98.3 (13 Dec 2022 17:33)  HR: 64 (14 Dec 2022 04:48) (60 - 68)  BP: 127/66 (14 Dec 2022 04:48) (107/56 - 127/66)  BP(mean): 85 (13 Dec 2022 18:29) (85 - 85)  RR: 18 (14 Dec 2022 04:48) (14 - 20)  SpO2: 95% (14 Dec 2022 04:48) (95% - 100%)    Parameters below as of 14 Dec 2022 04:48  Patient On (Oxygen Delivery Method): room air            I&O's Summary    13 Dec 2022 07:01  -  14 Dec 2022 07:00  --------------------------------------------------------  IN: 50 mL / OUT: 0 mL / NET: 50 mL      PHYSICAL EXAM:  GENERAL: NAD, well-developed  HEAD:  Atraumatic, Normocephalic  CHEST/LUNG: Breathing comfortably on room air. No increased work of breathing noted.   HEART: In no acute cardiopulmonary distress noted.   PSYCH: AAOx3  SKIN: R ear- two surgical incision- #1 to upper antihelix and posterior ear, pink and moist , no active bleeding, purulence, or malodor noted.      Dressing change performed. Patient tolerated well.

## 2022-12-14 NOTE — DISCHARGE NOTE PROVIDER - NSDCMRMEDTOKEN_GEN_ALL_CORE_FT
acetaminophen 325 mg oral tablet: 2 tab(s) orally every 6 hours, As needed, Temp greater or equal to 38C (100.4F), Mild Pain (1 - 3)  Diflucan 150 mg oral tablet: 1 tab(s) orally once a day   levoFLOXacin 750 mg oral tablet: 1 tab(s) orally every 24 hours   acetaminophen 325 mg oral tablet: 2 tab(s) orally every 6 hours, As needed, Temp greater or equal to 38C (100.4F), Mild Pain (1 - 3)  fluconazole 50 mg oral tablet: 3 tab(s) orally once   levoFLOXacin 750 mg oral tablet: 1 tab(s) orally once a day

## 2022-12-14 NOTE — DISCHARGE NOTE NURSING/CASE MANAGEMENT/SOCIAL WORK - NSDCPEFALRISK_GEN_ALL_CORE
For information on Fall & Injury Prevention, visit: [email or web address] OR  [email or web address] OR  https://www.cdc.gov/steadi/patient.html

## 2022-12-14 NOTE — DISCHARGE NOTE PROVIDER - PROVIDER TOKENS
PROVIDER:[TOKEN:[[id number]],FOLLOWUP:[Routine]],PROVIDER:[TOKEN:[[id number]],FOLLOWUP:[1 week],ESTABLISHEDPATIENT:[T]]

## 2022-12-14 NOTE — PROGRESS NOTE ADULT - ASSESSMENT
ASSESSMENT/ PLAN :   Stable  POD #1 s/p debridement of right ear    Continue wound care/ dressing changes-Please wash wound with soap and water, cover with dry gauze or bandaid twice a day.       Continue pain mgmt, VTE prophylaxis  Continue IV ABx per ID  Patient to follow up with outpatient in burn clinic for further management     Plan of care discussed with patient and father at bedside. Concerns addressed.

## 2022-12-14 NOTE — PROGRESS NOTE ADULT - REASON FOR ADMISSION
R ear perichondritis s/p piercing 11/18

## 2022-12-14 NOTE — DISCHARGE NOTE PROVIDER - ATTENDING DISCHARGE PHYSICAL EXAMINATION:
Patient seen and examined at bedside. S/p debridement yesterday with burn. Says pain is controlled. No fevers. Wants to go home. Stable for d/c.     PHYSICAL EXAM:  Constitutional: NAD, awake and alert  HEENT: PERR, EOMI, Normal Hearing, MMM. Right ear cartilage swollen.  Neck: Soft and supple, No LAD, No JVD  Respiratory: Breath sounds are clear bilaterally, No wheezing, rales or rhonchi  Cardiovascular: S1 and S2, regular rate and rhythm, no Murmurs, gallops or rubs  Gastrointestinal: Bowel Sounds present, soft, nontender, nondistended, no guarding, no rebound  Extremities: No peripheral edema    Vital Signs Last 24 Hrs  T(C): 36.5 (14 Dec 2022 04:48), Max: 36.8 (13 Dec 2022 17:33)  T(F): 97.7 (14 Dec 2022 04:48), Max: 98.3 (13 Dec 2022 17:33)  HR: 64 (14 Dec 2022 04:48) (60 - 68)  BP: 127/66 (14 Dec 2022 04:48) (107/56 - 127/66)  BP(mean): 85 (13 Dec 2022 18:29) (85 - 85)  RR: 18 (14 Dec 2022 04:48) (14 - 20)  SpO2: 95% (14 Dec 2022 04:48) (95% - 100%)    Parameters below as of 14 Dec 2022 04:48  Patient On (Oxygen Delivery Method): room air

## 2022-12-14 NOTE — DISCHARGE NOTE NURSING/CASE MANAGEMENT/SOCIAL WORK - PATIENT PORTAL LINK FT
You can access the FollowMyHealth Patient Portal offered by [facility] by registering at the following website: [email or web address]. By joining [employer]’s FollowMyHealth portal, you will also be able to view your health information using other applications (apps) compatible with our system.

## 2022-12-14 NOTE — DISCHARGE NOTE PROVIDER - NSDCCPCAREPLAN_GEN_ALL_CORE_FT
PRINCIPAL DISCHARGE DIAGNOSIS  Diagnosis: Perichondritis  Assessment and Plan of Treatment: You were diagnosed with R ear perichondiritis. You underwent surgical debridement and will continue oral antibiotics. Please follow up with burn as described.

## 2022-12-14 NOTE — DISCHARGE NOTE PROVIDER - CARE PROVIDER_API CALL
LAN BONILLA  Internal Medicine  [address] [place] [id number]  Phone: [phone]  Fax: [phone]  Follow Up Time: Routine    Marcelino Foy)  Plastic Surgery  [address] [place] [address]  Phone: [phone]  Fax: [phone]  Established Patient  Follow Up Time: 1 week

## 2022-12-14 NOTE — DISCHARGE NOTE PROVIDER - HOSPITAL COURSE
A 22-year-old female with pmhx of IBS presenting to the ED for evaluation of right ear infection x1 week.  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 Pt reports on Tuesday her dog accidentally pressed on her ear. PT states on Tuesday she removed her piercing. Pt states has been cleaning her piercing with H2o [place] spray. Pt reports clear discharge. Pt reports has been having more pain and swelling every day. Last piercing on her right ear was 6 months ago. Pt denies fever, chills, chest pain, shortness of breath, burning with urination. Pt reports chronic diarrhea from IBS. Pt had I& D done in ER. Patient was then started on Levaquin and underwent surgical debridement by burn in the OR. Patient is stable post op day 1 and can be discharged home.

## 2022-12-15 LAB
-  AMIKACIN: SIGNIFICANT CHANGE UP
-  AZTREONAM: SIGNIFICANT CHANGE UP
-  CEFEPIME: SIGNIFICANT CHANGE UP
-  CEFTAZIDIME: SIGNIFICANT CHANGE UP
-  CIPROFLOXACIN: SIGNIFICANT CHANGE UP
-  GENTAMICIN: SIGNIFICANT CHANGE UP
-  IMIPENEM: SIGNIFICANT CHANGE UP
-  LEVOFLOXACIN: SIGNIFICANT CHANGE UP
-  MEROPENEM: SIGNIFICANT CHANGE UP
-  PIPERACILLIN/TAZOBACTAM: SIGNIFICANT CHANGE UP
-  TOBRAMYCIN: SIGNIFICANT CHANGE UP
CULTURE RESULTS: SIGNIFICANT CHANGE UP
CULTURE RESULTS: SIGNIFICANT CHANGE UP
METHOD TYPE: SIGNIFICANT CHANGE UP
SPECIMEN SOURCE: SIGNIFICANT CHANGE UP
SPECIMEN SOURCE: SIGNIFICANT CHANGE UP

## 2022-12-16 LAB
-  AMIKACIN: SIGNIFICANT CHANGE UP
-  AMPICILLIN/SULBACTAM: SIGNIFICANT CHANGE UP
-  AZTREONAM: SIGNIFICANT CHANGE UP
-  CEFAZOLIN: SIGNIFICANT CHANGE UP
-  CEFEPIME: SIGNIFICANT CHANGE UP
-  CEFTAZIDIME: SIGNIFICANT CHANGE UP
-  CIPROFLOXACIN: SIGNIFICANT CHANGE UP
-  CLINDAMYCIN: SIGNIFICANT CHANGE UP
-  ERYTHROMYCIN: SIGNIFICANT CHANGE UP
-  GENTAMICIN: SIGNIFICANT CHANGE UP
-  GENTAMICIN: SIGNIFICANT CHANGE UP
-  IMIPENEM: SIGNIFICANT CHANGE UP
-  LEVOFLOXACIN: SIGNIFICANT CHANGE UP
-  MEROPENEM: SIGNIFICANT CHANGE UP
-  OXACILLIN: SIGNIFICANT CHANGE UP
-  PIPERACILLIN/TAZOBACTAM: SIGNIFICANT CHANGE UP
-  RIFAMPIN: SIGNIFICANT CHANGE UP
-  TETRACYCLINE: SIGNIFICANT CHANGE UP
-  TOBRAMYCIN: SIGNIFICANT CHANGE UP
-  TRIMETHOPRIM/SULFAMETHOXAZOLE: SIGNIFICANT CHANGE UP
-  VANCOMYCIN: SIGNIFICANT CHANGE UP
METHOD TYPE: SIGNIFICANT CHANGE UP
METHOD TYPE: SIGNIFICANT CHANGE UP

## 2022-12-18 LAB
CULTURE RESULTS: SIGNIFICANT CHANGE UP
ORGANISM # SPEC MICROSCOPIC CNT: SIGNIFICANT CHANGE UP
SPECIMEN SOURCE: SIGNIFICANT CHANGE UP

## 2022-12-19 LAB
CULTURE RESULTS: SIGNIFICANT CHANGE UP
CULTURE RESULTS: SIGNIFICANT CHANGE UP
ORGANISM # SPEC MICROSCOPIC CNT: SIGNIFICANT CHANGE UP
ORGANISM # SPEC MICROSCOPIC CNT: SIGNIFICANT CHANGE UP
SPECIMEN SOURCE: SIGNIFICANT CHANGE UP
SPECIMEN SOURCE: SIGNIFICANT CHANGE UP

## 2022-12-20 PROBLEM — K58.9 IRRITABLE BOWEL SYNDROME, UNSPECIFIED: Chronic | Status: ACTIVE | Noted: 2022-12-10

## 2022-12-20 PROBLEM — K58.9 IRRITABLE BOWEL SYNDROME WITHOUT DIARRHEA: Chronic | Status: ACTIVE | Noted: 2022-12-10

## 2022-12-22 ENCOUNTER — APPOINTMENT (OUTPATIENT)
Dept: BURN CARE | Facility: CLINIC | Age: 22
End: 2022-12-22

## 2023-01-26 ENCOUNTER — OUTPATIENT (OUTPATIENT)
Dept: OUTPATIENT SERVICES | Facility: HOSPITAL | Age: 23
LOS: 1 days | Discharge: HOME | End: 2023-01-26

## 2023-01-26 ENCOUNTER — APPOINTMENT (OUTPATIENT)
Dept: BURN CARE | Facility: CLINIC | Age: 23
End: 2023-01-26
Payer: COMMERCIAL

## 2023-01-26 VITALS — SYSTOLIC BLOOD PRESSURE: 103 MMHG | DIASTOLIC BLOOD PRESSURE: 67 MMHG | HEART RATE: 63 BPM

## 2023-01-26 PROCEDURE — 99212 OFFICE O/P EST SF 10 MIN: CPT

## 2023-01-26 NOTE — ASSESSMENT
[FreeTextEntry1] : The right ear wound measured 1x1cm and is healed with mild erythema and edema.  There is no drainage and no open wounds.  The patient was instructed to clean  the wound with soap and water. Continue local wound care with moisturizer and sunscreen. Follow up prn.  [Wound Care] : wound care

## 2023-01-26 NOTE — PHYSICAL EXAM
[Closed] : closed [Size%: ______] : Size: [unfilled]% [Infected?] : Infected: No [2] : 2 out of 10 [Normal] : normal [None] : none [] : no [de-identified] : The right ear wound measured 1x1cm and is healed with mild erythema and edema.  There is no drainage and no open wounds.  The patient was instructed to clean  the wound with soap and water. Continue local wound care with moisturizer and sunscreen. Follow up prn.

## 2023-01-26 NOTE — HISTORY OF PRESENT ILLNESS
[Did you have an operation on your burn/wound injury?] : Did you have an operation on your burn/wound injury? Yes [Did this injury occur on the job?] : Did this injury occur on the job? No [de-identified] : 11/18/22 [de-identified] : abscess right ear due to peircing [de-identified] : healed with recent erythema and 'tiny bumps'

## 2023-01-27 DIAGNOSIS — H92.01 OTALGIA, RIGHT EAR: ICD-10-CM

## 2023-09-25 ENCOUNTER — LABORATORY RESULT (OUTPATIENT)
Age: 23
End: 2023-09-25

## 2023-09-26 ENCOUNTER — APPOINTMENT (OUTPATIENT)
Dept: OBGYN | Facility: CLINIC | Age: 23
End: 2023-09-26
Payer: COMMERCIAL

## 2023-09-26 VITALS — BODY MASS INDEX: 32.2 KG/M2 | TEMPERATURE: 97.2 F | WEIGHT: 175 LBS | HEIGHT: 62 IN

## 2023-09-26 DIAGNOSIS — Z01.419 ENCOUNTER FOR GYNECOLOGICAL EXAMINATION (GENERAL) (ROUTINE) W/OUT ABNORMAL FINDINGS: ICD-10-CM

## 2023-09-26 PROCEDURE — 99395 PREV VISIT EST AGE 18-39: CPT

## 2023-09-29 PROBLEM — Z01.419 WELL WOMAN EXAM: Status: ACTIVE | Noted: 2023-09-29

## 2023-12-13 RX ORDER — PROGESTERONE 200 MG/1
200 CAPSULE ORAL
Qty: 5 | Refills: 0 | Status: ACTIVE | COMMUNITY
Start: 2023-12-13 | End: 1900-01-01

## 2024-01-03 ENCOUNTER — APPOINTMENT (OUTPATIENT)
Dept: OBGYN | Facility: CLINIC | Age: 24
End: 2024-01-03
Payer: COMMERCIAL

## 2024-01-03 PROCEDURE — 76830 TRANSVAGINAL US NON-OB: CPT

## 2024-01-22 ENCOUNTER — LABORATORY RESULT (OUTPATIENT)
Age: 24
End: 2024-01-22

## 2024-01-23 ENCOUNTER — NON-APPOINTMENT (OUTPATIENT)
Age: 24
End: 2024-01-23

## 2024-01-23 RX ORDER — PROGESTERONE 200 MG/1
200 CAPSULE ORAL
Qty: 30 | Refills: 1 | Status: ACTIVE | COMMUNITY
Start: 2024-01-23 | End: 1900-01-01

## 2024-02-06 ENCOUNTER — LABORATORY RESULT (OUTPATIENT)
Age: 24
End: 2024-02-06

## 2024-02-06 ENCOUNTER — APPOINTMENT (OUTPATIENT)
Dept: OBGYN | Facility: CLINIC | Age: 24
End: 2024-02-06
Payer: COMMERCIAL

## 2024-02-06 VITALS — HEIGHT: 62 IN | TEMPERATURE: 97.3 F | WEIGHT: 207 LBS | BODY MASS INDEX: 38.09 KG/M2

## 2024-02-06 PROCEDURE — 76830 TRANSVAGINAL US NON-OB: CPT

## 2024-02-06 PROCEDURE — 99213 OFFICE O/P EST LOW 20 MIN: CPT | Mod: 25

## 2024-02-06 NOTE — DISCUSSION/SUMMARY
[FreeTextEntry1] : 23 YEAR ODL FEMALE LMP 12/20/2023 ( AFTER TAKING PROGESTERONE TO INDUCE WITHDRAWAL BLEED) PRESENTS WITH + PREGNANCY TESTING ( BLOOD WORK DONE 1/22/2024 WITH HCG 46.8 AND PROGESTERONE 9.8.  PT TOOK CLOMID 100 MG DAY 5-9 AND, AS PER PATIENT, TESTING SHOWED OVULATION OCCURRED LATER IN CYCLE. PT WAS PLACED ON MICRONIZED PROGESTERONE 200 MG /DAY 1/23/2024 WHICH SHE IS TAKING ALONG WITH PRENATAL VITAMINS.. + TIRES; + NAUSEA; + BREAST SENSITIVITY; + URINARY FREQUENCY.. RECENTLY SEEN IN URGENT CARE WITH ? URI, AND WAS PRESCRIBED AZITHROMAX, WHICH SHE DIDN'T TAKE. PT FEELING BETTER AND HAVING NO FEVERS OR RESPIRATORY PROBLEMS. HAVING A DIFFERENT VAGINAL DISCHARGE; NO ODOR OR ITCHING. MEDICATION ALLERGIES; NONE  PE;/70; TEMP 97.3; WEIGHT 207 LBS [id number] 5'2"       ABDOMEN;SOFT, NO MASSES; NO TENDERNESS TO PALPATION      PELVIC BIMANUAL EXAMIANTION WITH ANTEVERTED NORMAL UTERUS AND NO ADNEXAL              MASSES PALPABLE   TV US PERFORMED DEMONSTRATING A GESTATIONAL SAC WITHIN THE UTERINE CAVITY ALONG WITH A WELL DEFINED YOLK SAC AND A VERY EARLY TINY ? FETAL POLE. NO FETAL HEART ACTIVITIY NOTED. RT OVARY VISTUALIZED AND NORMAL EXCEPT FOR MULTIPLE SUB CM CYSTS. LEFT OVARY NOT CLEARLY SEEN. ADNEXA NEGATIVE . NO FREE FLUID IN CUL DE SCA.  IMP; SECONDARY AMENORRHEA          LOW PROGESTERONE LEVEL          ?PCOS          ? VAGINITIS  PLAN; CONTINUED PRENATL VITAMINS AND MICRONIZED PRORGESTERONE 200 MG OD            VAGINAL CULTURE DONE            REPEAT HCG AND PROGESTERONE LEVEL ORDERED AND SCRIPT GIVEN            RETURN TO OFFICE 2 WKS OR PRN

## 2024-02-07 ENCOUNTER — NON-APPOINTMENT (OUTPATIENT)
Age: 24
End: 2024-02-07

## 2024-02-15 ENCOUNTER — APPOINTMENT (OUTPATIENT)
Dept: OBGYN | Facility: CLINIC | Age: 24
End: 2024-02-15
Payer: COMMERCIAL

## 2024-02-15 VITALS — TEMPERATURE: 97.4 F | BODY MASS INDEX: 38.09 KG/M2 | HEIGHT: 62 IN | WEIGHT: 207 LBS

## 2024-02-15 PROCEDURE — 76830 TRANSVAGINAL US NON-OB: CPT

## 2024-02-15 PROCEDURE — 99212 OFFICE O/P EST SF 10 MIN: CPT | Mod: 25

## 2024-02-15 RX ORDER — PROGESTERONE 200 MG/1
200 CAPSULE ORAL
Qty: 60 | Refills: 1 | Status: ACTIVE | COMMUNITY
Start: 2024-02-15 | End: 1900-01-01

## 2024-02-18 NOTE — DISCUSSION/SUMMARY
[FreeTextEntry1] : 23 YEAR OLD FEMALE LMP 12/20/2023 PRESENTS FOR FOLLOW UP VISIT TO CONFIRM VIABLE IUP AND TO START PRENATAL CARE. PT WAS SEEN 2/6/2024 AFTER HAVING TAKEN CLOMID FOR OVULATION INDUCTION 100 MG DAY 5-9 OF CYCLE. AT THAT TIME PT HAD HAD HCG TITER DONE 1/22/2024 WHICH WAS POSITIVE WITH A VALUE OF 46.8, AND ALSO HAD A PROGESTERONE LEVEL DONE WITH A VALUE OF 9.590.PT WAS STARTED ON PROGESTERONE 200 MG OD AFTER OBTAINING THOSE RESULTS AND AT THE TIME OF HER VISIT ON 2/6/2024, TV US WAS ABLE TO DEMONSTRATE A GESTATIONAL SAC, A YOLK SAC, A ? EARLY FETAL POLE, BUT NO FETAL HEART ACTIVITY.  FOLLOW UP HCT TITER DONE AT THE TIME OF HER VISIT 2/6/2024 WAS 89338 AND PROGESTERONE WAS 7.09. MICRONIZED PROGESTERONE WAS INCREASED  MG BID. NOW PT IS VERY TIRED, NAUSEOUS, AND HAS BREAST SENSITIVITY. SHE IS ALSO TAKING PRENATAL VITAMINS.  PE;/68; TEMP 97.4; WEIGHT 207 LBS; HEIGHT 5'2"       BIMANUAL PELVIC EXAMINATION WITH UTERUS ANTEVERTED AND GLOBULAR IN CONTOUR  TV US NOW ABLE TO CLEARLY DEMONSTRATE A GESTATIONAL SAC WITHIN THE UTERINE CAVITY WITH A WELL DEFINED YOLK SAC AND A FETAL POLE MEASURING 6 WKS  4 DSAYS WITH FETAL HEART ACTIVITY  BPM.  ADDTIONALLY RT OVARY INS SEEN AND NORMAL WITH MULTIPLE SUBCM CYSTS WITHIN. LEFT OVARY NOT VISUALEZED BUT NO ADNEXAL MASSES IN EITHER ADNEXA. NO FREE FLUID IN CUL DE SAC.   IMP; SECONDARY AMENORRHEA          HISTORY OF PCOS          LOW PROGESTERONE LEVEL           IUP 6 WKS 4 DAYS.  PLAN; CONTINUE PRENATAL VITAMINS; CONTINUED MICRONIZED PROGESTERONE              200 MG BID - EPRESCRIBED TO PHARMACY             RETURN TO OFFICE IN 2-3 WKS AND IF APPROPRIATE DEVELOPEMENT AND               FETAL ACTIIVITY NOTED, WILL THEN ARRANGE FOR ROUTINE PRENATAL                BLOOD WORK , NIPT TESTING AND HEREDITARY CARRIER TESITNG AND               SCHEDULE FOR 1ST TRIMESTER US WITH NT MEASUREMEMENT.

## 2024-02-20 ENCOUNTER — APPOINTMENT (OUTPATIENT)
Dept: OBGYN | Facility: CLINIC | Age: 24
End: 2024-02-20

## 2024-02-22 ENCOUNTER — LABORATORY RESULT (OUTPATIENT)
Age: 24
End: 2024-02-22

## 2024-02-27 ENCOUNTER — EMERGENCY (EMERGENCY)
Facility: HOSPITAL | Age: 24
LOS: 0 days | Discharge: LEFT BEFORE DISCHARGE | End: 2024-02-27
Attending: EMERGENCY MEDICINE
Payer: COMMERCIAL

## 2024-02-27 VITALS
HEART RATE: 78 BPM | WEIGHT: 207.01 LBS | SYSTOLIC BLOOD PRESSURE: 111 MMHG | HEIGHT: 63 IN | RESPIRATION RATE: 17 BRPM | OXYGEN SATURATION: 97 % | DIASTOLIC BLOOD PRESSURE: 55 MMHG | TEMPERATURE: 98 F

## 2024-02-27 DIAGNOSIS — R10.9 UNSPECIFIED ABDOMINAL PAIN: ICD-10-CM

## 2024-02-27 DIAGNOSIS — O99.281 ENDOCRINE, NUTRITIONAL AND METABOLIC DISEASES COMPLICATING PREGNANCY, FIRST TRIMESTER: ICD-10-CM

## 2024-02-27 DIAGNOSIS — N89.8 OTHER SPECIFIED NONINFLAMMATORY DISORDERS OF VAGINA: ICD-10-CM

## 2024-02-27 DIAGNOSIS — Z91.018 ALLERGY TO OTHER FOODS: ICD-10-CM

## 2024-02-27 DIAGNOSIS — E28.2 POLYCYSTIC OVARIAN SYNDROME: ICD-10-CM

## 2024-02-27 DIAGNOSIS — O99.611 DISEASES OF THE DIGESTIVE SYSTEM COMPLICATING PREGNANCY, FIRST TRIMESTER: ICD-10-CM

## 2024-02-27 DIAGNOSIS — Z3A.08 8 WEEKS GESTATION OF PREGNANCY: ICD-10-CM

## 2024-02-27 DIAGNOSIS — O99.891 OTHER SPECIFIED DISEASES AND CONDITIONS COMPLICATING PREGNANCY: ICD-10-CM

## 2024-02-27 DIAGNOSIS — Z53.29 PROCEDURE AND TREATMENT NOT CARRIED OUT BECAUSE OF PATIENT'S DECISION FOR OTHER REASONS: ICD-10-CM

## 2024-02-27 DIAGNOSIS — K58.9 IRRITABLE BOWEL SYNDROME WITHOUT DIARRHEA: ICD-10-CM

## 2024-02-27 LAB
ACANTHOCYTES BLD QL SMEAR: SLIGHT — SIGNIFICANT CHANGE UP
ALBUMIN SERPL ELPH-MCNC: 4.8 G/DL — SIGNIFICANT CHANGE UP (ref 3.5–5.2)
ALP SERPL-CCNC: 49 U/L — SIGNIFICANT CHANGE UP (ref 30–115)
ALT FLD-CCNC: 20 U/L — SIGNIFICANT CHANGE UP (ref 0–41)
ANION GAP SERPL CALC-SCNC: 13 MMOL/L — SIGNIFICANT CHANGE UP (ref 7–14)
ANISOCYTOSIS BLD QL: SLIGHT — SIGNIFICANT CHANGE UP
AST SERPL-CCNC: 18 U/L — SIGNIFICANT CHANGE UP (ref 0–41)
BASOPHILS # BLD AUTO: 0 K/UL — SIGNIFICANT CHANGE UP (ref 0–0.2)
BASOPHILS NFR BLD AUTO: 0 % — SIGNIFICANT CHANGE UP (ref 0–1)
BILIRUB SERPL-MCNC: 0.3 MG/DL — SIGNIFICANT CHANGE UP (ref 0.2–1.2)
BLD GP AB SCN SERPL QL: SIGNIFICANT CHANGE UP
BUN SERPL-MCNC: 11 MG/DL — SIGNIFICANT CHANGE UP (ref 10–20)
BURR CELLS BLD QL SMEAR: PRESENT — SIGNIFICANT CHANGE UP
CALCIUM SERPL-MCNC: 9.7 MG/DL — SIGNIFICANT CHANGE UP (ref 8.4–10.5)
CHLORIDE SERPL-SCNC: 101 MMOL/L — SIGNIFICANT CHANGE UP (ref 98–110)
CO2 SERPL-SCNC: 22 MMOL/L — SIGNIFICANT CHANGE UP (ref 17–32)
CREAT SERPL-MCNC: 0.7 MG/DL — SIGNIFICANT CHANGE UP (ref 0.7–1.5)
EGFR: 126 ML/MIN/1.73M2 — SIGNIFICANT CHANGE UP
EOSINOPHIL # BLD AUTO: 0 K/UL — SIGNIFICANT CHANGE UP (ref 0–0.7)
EOSINOPHIL NFR BLD AUTO: 0 % — SIGNIFICANT CHANGE UP (ref 0–8)
GIANT PLATELETS BLD QL SMEAR: PRESENT — SIGNIFICANT CHANGE UP
GLUCOSE SERPL-MCNC: 79 MG/DL — SIGNIFICANT CHANGE UP (ref 70–99)
HCG SERPL-ACNC: HIGH MIU/ML
HCT VFR BLD CALC: 37.4 % — SIGNIFICANT CHANGE UP (ref 37–47)
HGB BLD-MCNC: 12 G/DL — SIGNIFICANT CHANGE UP (ref 12–16)
HYPOCHROMIA BLD QL: SLIGHT — SIGNIFICANT CHANGE UP
LYMPHOCYTES # BLD AUTO: 1.63 K/UL — SIGNIFICANT CHANGE UP (ref 1.2–3.4)
LYMPHOCYTES # BLD AUTO: 13 % — LOW (ref 20.5–51.1)
MANUAL SMEAR VERIFICATION: SIGNIFICANT CHANGE UP
MCHC RBC-ENTMCNC: 22.5 PG — LOW (ref 27–31)
MCHC RBC-ENTMCNC: 32.1 G/DL — SIGNIFICANT CHANGE UP (ref 32–37)
MCV RBC AUTO: 70.2 FL — LOW (ref 81–99)
MICROCYTES BLD QL: SLIGHT — SIGNIFICANT CHANGE UP
MONOCYTES # BLD AUTO: 0.54 K/UL — SIGNIFICANT CHANGE UP (ref 0.1–0.6)
MONOCYTES NFR BLD AUTO: 4.3 % — SIGNIFICANT CHANGE UP (ref 1.7–9.3)
MYELOCYTES NFR BLD: 0.9 % — HIGH (ref 0–0)
NEUTROPHILS # BLD AUTO: 9.39 K/UL — HIGH (ref 1.4–6.5)
NEUTROPHILS NFR BLD AUTO: 74.8 % — SIGNIFICANT CHANGE UP (ref 42.2–75.2)
NRBC # BLD: 1 /100 WBCS — HIGH (ref 0–0)
NRBC # BLD: SIGNIFICANT CHANGE UP /100 WBCS (ref 0–0)
OVALOCYTES BLD QL SMEAR: SLIGHT — SIGNIFICANT CHANGE UP
PLAT MORPH BLD: ABNORMAL
PLATELET # BLD AUTO: 368 K/UL — SIGNIFICANT CHANGE UP (ref 130–400)
PMV BLD: 11.2 FL — HIGH (ref 7.4–10.4)
POIKILOCYTOSIS BLD QL AUTO: SLIGHT — SIGNIFICANT CHANGE UP
POLYCHROMASIA BLD QL SMEAR: SLIGHT — SIGNIFICANT CHANGE UP
POTASSIUM SERPL-MCNC: 4.1 MMOL/L — SIGNIFICANT CHANGE UP (ref 3.5–5)
POTASSIUM SERPL-SCNC: 4.1 MMOL/L — SIGNIFICANT CHANGE UP (ref 3.5–5)
PROT SERPL-MCNC: 7.3 G/DL — SIGNIFICANT CHANGE UP (ref 6–8)
RBC # BLD: 5.33 M/UL — SIGNIFICANT CHANGE UP (ref 4.2–5.4)
RBC # FLD: 15.8 % — HIGH (ref 11.5–14.5)
RBC BLD AUTO: ABNORMAL
SMUDGE CELLS # BLD: PRESENT — SIGNIFICANT CHANGE UP
SODIUM SERPL-SCNC: 136 MMOL/L — SIGNIFICANT CHANGE UP (ref 135–146)
VARIANT LYMPHS # BLD: 7 % — HIGH (ref 0–5)
WBC # BLD: 12.56 K/UL — HIGH (ref 4.8–10.8)
WBC # FLD AUTO: 12.56 K/UL — HIGH (ref 4.8–10.8)

## 2024-02-27 PROCEDURE — 99284 EMERGENCY DEPT VISIT MOD MDM: CPT | Mod: 25

## 2024-02-27 PROCEDURE — 86901 BLOOD TYPING SEROLOGIC RH(D): CPT

## 2024-02-27 PROCEDURE — 76830 TRANSVAGINAL US NON-OB: CPT | Mod: 26

## 2024-02-27 PROCEDURE — 99284 EMERGENCY DEPT VISIT MOD MDM: CPT

## 2024-02-27 PROCEDURE — 84702 CHORIONIC GONADOTROPIN TEST: CPT

## 2024-02-27 PROCEDURE — 36415 COLL VENOUS BLD VENIPUNCTURE: CPT

## 2024-02-27 PROCEDURE — 86850 RBC ANTIBODY SCREEN: CPT

## 2024-02-27 PROCEDURE — 86900 BLOOD TYPING SEROLOGIC ABO: CPT

## 2024-02-27 PROCEDURE — 80053 COMPREHEN METABOLIC PANEL: CPT

## 2024-02-27 PROCEDURE — 85025 COMPLETE CBC W/AUTO DIFF WBC: CPT

## 2024-02-27 PROCEDURE — 76830 TRANSVAGINAL US NON-OB: CPT

## 2024-02-27 NOTE — ED PROVIDER NOTE - CLINICAL SUMMARY MEDICAL DECISION MAKING FREE TEXT BOX
23-year-old female with history of PCOS, IBS, , at 8 weeks gestation, has had confirmed IUP, presents with girlfriend with having had a small amount of clear vaginal discharge earlier today after having had intercourse. Also states she has had improved appetite over the few days which is new from recently and is concerned that this might represent something wrong with her pregnancy. Reports mild lower abdominal cramping, but states this is expected given her past IBS. Denies all other symptoms, vaginal bleeding, vomiting, fever. On exam, afebrile, hemodynamically stable, saturating well on room air, NAD, well appearing, sitting comfortably in bed, no WOB, speaking full sentences, head NCAT, EOMI grossly, anicteric, MMM, RRR, breathing comfortably on room air, abd soft, NT, ND, no rebound or guarding, AAO, CN's 3-12 grossly intact, MEJIA spontaneously, no leg cyanosis or edema, skin warm, well perfused, no rashes or hives. Abdomen entirely benign, with low suspicion for acute intra-abdominal process. Character low suspicion for ectopic/ovarian torsion, and ultrasound unremarkable and confirms IUP. Character low suspicion for PID or UTI. Patient has picture of her discharge and could be physiologic. Patient no longer to be found following ultrasound.

## 2024-02-27 NOTE — ED PROVIDER NOTE - OBJECTIVE STATEMENT
23-year-old female  with PMH PCOS, currently on progesterone due to low progesterone levels during pregnancy presenting for vaginal discharge today and lower abdominal cramping X 3 days. Patient reports she had very rough sex this morning after which she had vaginal discharge did not have before; patient provides picture of vaginal discharge showing scant amount of clear mucousy discharge. Denies dysuria, hematuria, back pain, nausea, vomiting, fever. Patient is seen her OB Dr. Maldonado 3 times; confirmed IUP. Accompanied in ED by girlfriend

## 2024-02-27 NOTE — ED ADULT NURSE NOTE - NSFALLHARMRISKINTERV_ED_ALL_ED

## 2024-02-27 NOTE — ED PROVIDER NOTE - CARE PLAN
Principal Discharge DX:	Abdominal pain in pregnancy   1 Principal Discharge DX:	Abdominal pain in pregnancy  Secondary Diagnosis:	Vaginal discharge

## 2024-02-27 NOTE — ED PROVIDER NOTE - PHYSICAL EXAMINATION
CONSTITUTIONAL: Well-developed; well-nourished; in no acute distress.   SKIN: Warm, dry  HEAD: Normocephalic; atraumatic  EYES: EOMI, normal sclera and conjunctiva   ENT: No nasal discharge; airway clear.  CARD:  Regular rate and rhythm. Normal S1, S2. MMM  RESP: No increased WOB. CTA b/l without wheezes, crackles, rhonchi  ABD: Soft, nontender, nondistended. No CVA tenderness  : (chaperoned by ANGEL Lauren) normal external female genitalia. Scant white/pale yellow discharge from cervic.   EXT: Normal ROM.   NEURO: Alert, oriented, grossly unremarkable  PSYCH: Cooperative, appropriate.

## 2024-03-05 ENCOUNTER — APPOINTMENT (OUTPATIENT)
Dept: OBGYN | Facility: CLINIC | Age: 24
End: 2024-03-05
Payer: COMMERCIAL

## 2024-03-05 VITALS — HEIGHT: 62 IN | WEIGHT: 210 LBS | BODY MASS INDEX: 38.64 KG/M2 | TEMPERATURE: 97.3 F

## 2024-03-05 DIAGNOSIS — N92.6 IRREGULAR MENSTRUATION, UNSPECIFIED: ICD-10-CM

## 2024-03-05 DIAGNOSIS — E28.2 POLYCYSTIC OVARIAN SYNDROME: ICD-10-CM

## 2024-03-05 DIAGNOSIS — R79.89 OTHER SPECIFIED ABNORMAL FINDINGS OF BLOOD CHEMISTRY: ICD-10-CM

## 2024-03-05 PROCEDURE — 99213 OFFICE O/P EST LOW 20 MIN: CPT | Mod: 25

## 2024-03-05 PROCEDURE — 76830 TRANSVAGINAL US NON-OB: CPT

## 2024-03-07 PROBLEM — R79.89 LOW SERUM PROGESTERONE: Status: ACTIVE | Noted: 2024-02-06

## 2024-03-07 PROBLEM — N92.6 IRREGULAR MENSES: Status: ACTIVE | Noted: 2022-01-06

## 2024-03-07 PROBLEM — E28.2 PCOS (POLYCYSTIC OVARIAN SYNDROME): Status: ACTIVE | Noted: 2022-01-06

## 2024-03-07 NOTE — DISCUSSION/SUMMARY
[FreeTextEntry1] : 23 YEAR OLD FEMALE LMP 12/20/2023  PRESENTS FOR FOLLOW UP VISIT.   SHE WAS PLACED ON PROGESTERONE  AFTER INITIAL BLOOD WORK, WHICH SHE IS STILL AKING. PT HAS A HISTORY OF PCOS AND  OVULATION WAS INDUCED WITH CLOMID AND HER INITIAL BLOOD WORK DONE 1/22/2024 WAS REPORTED AS HCG 46.8 AND PROGESTONE OF 9.8.FOLLOW UP BLOOD WORK AT TIME OF VISIT 2/6/2024 WITH HCG 10482 AND PROGESTERONE 7.09. PROGESTERONE WAS INCREASED AND PATIENT WAS SEEN 2/15/2024 AT WHICH TIME A GESATATIONAL SAC WAS SEEN WITHIN THE UTERINE CAVITY AS WELL AS A WELL DEFINED YOLK SAC AND AN EARLY FETAL POLE MEASURING 6' WITH A FETAL HEART  BPM.   FOLLOW UP BLOOD WORK DONE 2/22/2023 WITH HCG 15233 AND PROGESTERONE 14.91.  PT IS TIRED; NAUSEOUS, AND HAS BREAST SENSITIVITY. PT HAS SOME DIZZY SENSATION WHEN SHE EATS.   PE;/70; TEMP 97.3; WEIGHT 210 LBS HEIGHT 5'2"      BIMANUAL EXAMINATION WITH UTERUS ANTEVERTED AND APPROXIMATEL Y 9WK GEST SIZE  TV US PERFORMED NOW DEMONSTRATING AN INTRAUTERINE PREGNANCY 9WKS 6 DAYS WITH CORRESPONDING EDC OF 10/1/2024 WITH FETAL HEART ACTIVITIY  BPM. NOTH OVARIES VISUALIZED AND NORMAL.. ADNEXA NEGAITVE. NO FREE FLUID IN CUL DE SAC.  IMP; SECONDARY AMENORRHEA          HISTORY OF PCOS          LOW PROGESTERONE LEVEL           HISTORY OF IRREGULAR CYCLES          IUP 9 WKS 6 DAYS  PLAN; CONTINUE PRENATAL VITAMINS; CONTINUE MICRONIZED PROGESTERONE            RETURN TO OFFICE IN 2 WKS FOR 1ST TRIMESTER US WITH NT AND WILL THEN SEND PT                  FOR ROUTINE PRENATL BLOOD WORK, NIPT ANEUPLOIDY TESTING AND HEREDITARY                 CARRIER SCREENIG             RETURN TO OFFICE FOR PRENATAL VISIT IN 4 WKS AND WILL THEN SEND FOR MSAFP

## 2024-03-18 ENCOUNTER — LABORATORY RESULT (OUTPATIENT)
Age: 24
End: 2024-03-18

## 2024-03-19 ENCOUNTER — APPOINTMENT (OUTPATIENT)
Dept: OBGYN | Facility: CLINIC | Age: 24
End: 2024-03-19
Payer: COMMERCIAL

## 2024-03-19 ENCOUNTER — ASOB RESULT (OUTPATIENT)
Age: 24
End: 2024-03-19

## 2024-03-19 PROCEDURE — 76813 OB US NUCHAL MEAS 1 GEST: CPT | Mod: 26

## 2024-03-19 PROCEDURE — 76813 OB US NUCHAL MEAS 1 GEST: CPT | Mod: TC

## 2024-03-29 ENCOUNTER — NON-APPOINTMENT (OUTPATIENT)
Age: 24
End: 2024-03-29

## 2024-03-29 DIAGNOSIS — N91.1 SECONDARY AMENORRHEA: ICD-10-CM

## 2024-03-29 DIAGNOSIS — Z87.42 PERSONAL HISTORY OF OTHER DISEASES OF THE FEMALE GENITAL TRACT: ICD-10-CM

## 2024-03-29 DIAGNOSIS — Z86.19 PERSONAL HISTORY OF OTHER INFECTIOUS AND PARASITIC DISEASES: ICD-10-CM

## 2024-03-29 DIAGNOSIS — N39.0 URINARY TRACT INFECTION, SITE NOT SPECIFIED: ICD-10-CM

## 2024-03-29 DIAGNOSIS — Z30.9 ENCOUNTER FOR CONTRACEPTIVE MANAGEMENT, UNSPECIFIED: ICD-10-CM

## 2024-03-29 RX ORDER — FLUCONAZOLE 150 MG/1
150 TABLET ORAL DAILY
Qty: 3 | Refills: 1 | Status: COMPLETED | COMMUNITY
Start: 2021-05-12 | End: 2024-03-29

## 2024-03-29 RX ORDER — FLUCONAZOLE 150 MG/1
150 TABLET ORAL DAILY
Qty: 2 | Refills: 0 | Status: COMPLETED | COMMUNITY
Start: 2021-12-13 | End: 2024-03-29

## 2024-03-29 RX ORDER — NORGESTIMATE AND ETHINYL ESTRADIOL 7DAYSX3 28
0.18/0.215/0.25 KIT ORAL
Qty: 84 | Refills: 0 | Status: COMPLETED | COMMUNITY
Start: 2021-09-20 | End: 2024-03-29

## 2024-03-29 RX ORDER — NORGESTIMATE AND ETHINYL ESTRADIOL 7DAYSX3 28
0.18/0.215/0.25 KIT ORAL
Qty: 84 | Refills: 1 | Status: COMPLETED | COMMUNITY
Start: 2022-01-06 | End: 2024-03-29

## 2024-03-29 RX ORDER — FLUCONAZOLE 150 MG/1
150 TABLET ORAL
Qty: 2 | Refills: 1 | Status: COMPLETED | COMMUNITY
Start: 2022-01-14 | End: 2024-03-29

## 2024-03-29 RX ORDER — CLOMIPHENE CITRATE 50 MG/1
50 TABLET ORAL DAILY
Qty: 10 | Refills: 2 | Status: COMPLETED | COMMUNITY
Start: 2023-09-26 | End: 2024-03-29

## 2024-03-29 RX ORDER — METRONIDAZOLE 7.5 MG/G
0.75 GEL VAGINAL
Qty: 1 | Refills: 2 | Status: COMPLETED | COMMUNITY
Start: 2021-12-28 | End: 2024-03-29

## 2024-03-29 RX ORDER — FLUCONAZOLE 150 MG/1
150 TABLET ORAL DAILY
Qty: 2 | Refills: 2 | Status: COMPLETED | COMMUNITY
Start: 2021-07-30 | End: 2024-03-29

## 2024-03-29 RX ORDER — TERCONAZOLE 8 MG/G
0.8 CREAM VAGINAL
Qty: 1 | Refills: 1 | Status: COMPLETED | COMMUNITY
Start: 2021-05-17 | End: 2024-03-29

## 2024-03-29 RX ORDER — NORGESTIMATE AND ETHINYL ESTRADIOL 7DAYSX3 28
0.18/0.215/0.25 KIT ORAL
Qty: 1 | Refills: 1 | Status: COMPLETED | COMMUNITY
Start: 2021-05-06 | End: 2024-03-29

## 2024-03-29 RX ORDER — NORGESTIMATE AND ETHINYL ESTRADIOL 7DAYSX3 LO
0.18/0.215/0.25 KIT ORAL DAILY
Qty: 84 | Refills: 0 | Status: COMPLETED | COMMUNITY
Start: 2023-12-11 | End: 2024-03-29

## 2024-04-02 ENCOUNTER — APPOINTMENT (OUTPATIENT)
Dept: OBGYN | Facility: CLINIC | Age: 24
End: 2024-04-02
Payer: COMMERCIAL

## 2024-04-02 VITALS — WEIGHT: 210 LBS | BODY MASS INDEX: 38.64 KG/M2 | TEMPERATURE: 97.5 F | HEIGHT: 62 IN

## 2024-04-02 PROCEDURE — 0500F INITIAL PRENATAL CARE VISIT: CPT

## 2024-04-16 ENCOUNTER — LABORATORY RESULT (OUTPATIENT)
Age: 24
End: 2024-04-16

## 2024-04-17 ENCOUNTER — NON-APPOINTMENT (OUTPATIENT)
Age: 24
End: 2024-04-17

## 2024-04-22 ENCOUNTER — APPOINTMENT (OUTPATIENT)
Dept: OBGYN | Facility: CLINIC | Age: 24
End: 2024-04-22
Payer: COMMERCIAL

## 2024-04-22 VITALS — WEIGHT: 210 LBS | HEIGHT: 62 IN | BODY MASS INDEX: 38.64 KG/M2

## 2024-04-22 DIAGNOSIS — K21.9 GASTRO-ESOPHAGEAL REFLUX DISEASE W/OUT ESOPHAGITIS: ICD-10-CM

## 2024-04-22 PROCEDURE — 0502F SUBSEQUENT PRENATAL CARE: CPT

## 2024-04-22 RX ORDER — OMEPRAZOLE 20 MG/1
20 CAPSULE, DELAYED RELEASE ORAL DAILY
Qty: 90 | Refills: 3 | Status: ACTIVE | COMMUNITY
Start: 2024-04-22 | End: 1900-01-01

## 2024-05-15 ENCOUNTER — NON-APPOINTMENT (OUTPATIENT)
Age: 24
End: 2024-05-15

## 2024-05-17 ENCOUNTER — NON-APPOINTMENT (OUTPATIENT)
Age: 24
End: 2024-05-17

## 2024-05-26 ENCOUNTER — OUTPATIENT (OUTPATIENT)
Dept: INPATIENT UNIT | Facility: HOSPITAL | Age: 24
LOS: 1 days | Discharge: HOME | End: 2024-05-26
Payer: COMMERCIAL

## 2024-05-26 VITALS
HEART RATE: 76 BPM | TEMPERATURE: 98 F | SYSTOLIC BLOOD PRESSURE: 127 MMHG | RESPIRATION RATE: 18 BRPM | DIASTOLIC BLOOD PRESSURE: 66 MMHG

## 2024-05-26 VITALS — SYSTOLIC BLOOD PRESSURE: 127 MMHG | DIASTOLIC BLOOD PRESSURE: 66 MMHG | HEART RATE: 76 BPM

## 2024-05-26 DIAGNOSIS — O26.899 OTHER SPECIFIED PREGNANCY RELATED CONDITIONS, UNSPECIFIED TRIMESTER: ICD-10-CM

## 2024-05-26 PROCEDURE — 99214 OFFICE O/P EST MOD 30 MIN: CPT

## 2024-05-26 NOTE — OB PROVIDER TRIAGE NOTE - HISTORY OF PRESENT ILLNESS
22yo  at 22w0d, LANG 24, dated by LMP, c/w 1st trimester sono presents to L&D for decreased FM. She reports she felt less movement since last night. She denies abdominal pain, contractions, leakage of fluids and vaginal bleeding She reports an uncomplicated pregnancy. GBS unknown.    Last PO 2 hours ago  Last intercourse 3 days ago  Last BM yesterday

## 2024-05-26 NOTE — OB RN TRIAGE NOTE - FALL HARM RISK - UNIVERSAL INTERVENTIONS
Bed in lowest position, wheels locked, appropriate side rails in place/Call bell, personal items and telephone in reach/Instruct patient to call for assistance before getting out of bed or chair/Non-slip footwear when patient is out of bed/[place] to call system/Physically safe environment - no spills, clutter or unnecessary equipment/Purposeful Proactive Rounding/Room/bathroom lighting operational, light cord in reach

## 2024-05-26 NOTE — OB PROVIDER TRIAGE NOTE - NSHPLABSRESULTS_GEN_ALL_CORE
3/19  Hep B NR  Hep C NR  Rubella immune  Varicella immune  RPR NR  A POS, antibody negative  HIV NR    sonos:  8w5d: CRL 2.1cm, FHR 187bpm

## 2024-05-26 NOTE — OB PROVIDER TRIAGE NOTE - NSHPPHYSICALEXAM_GEN_ALL_CORE
Physical Exam  Vital Signs Last 24 Hrs  T(F): 98.1 (26 May 2024 17:24), Max: 98.1 (26 May 2024 17:24)  HR: 76 (26 May 2024 17:27) (76 - 76)  BP: 127/66 (26 May 2024 17:27) (127/66 - 127/66)  RR: 18 (26 May 2024 17:24) (18 - 18)    Gen: NAD, AOx3  Abdomen: soft, gravid, nontender, no palpable contractions    FHR 148bpm  [place]alt: not sandra  SVE: deferred    BSS: breech, modified MVP 4.5cm, movement noted

## 2024-05-26 NOTE — OB PROVIDER TRIAGE NOTE - NSOBPROVIDERNOTE_OBGYN_ALL_OB_FT
23y  @ 22w0d, GBS unknown, with reassuring maternal fetal status.  -Discharged Home  -Labor precautions reviewed  -PO Hydration encouraged  -Reviewed fetal kick counts  -Follow up with scheduled OB visit  -Patient verbalized understanding    Discussed with Dr. Bales and Dr. Parada

## 2024-05-28 ENCOUNTER — NON-APPOINTMENT (OUTPATIENT)
Age: 24
End: 2024-05-28

## 2024-05-28 ENCOUNTER — ASOB RESULT (OUTPATIENT)
Age: 24
End: 2024-05-28

## 2024-05-28 ENCOUNTER — APPOINTMENT (OUTPATIENT)
Dept: OBGYN | Facility: CLINIC | Age: 24
End: 2024-05-28
Payer: COMMERCIAL

## 2024-05-28 VITALS — WEIGHT: 212 LBS | HEIGHT: 62 IN | BODY MASS INDEX: 39.01 KG/M2

## 2024-05-28 DIAGNOSIS — Z3A.22 22 WEEKS GESTATION OF PREGNANCY: ICD-10-CM

## 2024-05-28 DIAGNOSIS — K58.9 IRRITABLE BOWEL SYNDROME WITHOUT DIARRHEA: ICD-10-CM

## 2024-05-28 DIAGNOSIS — Z34.90 ENCOUNTER FOR SUPERVISION OF NORMAL PREGNANCY, UNSPECIFIED, UNSPECIFIED TRIMESTER: ICD-10-CM

## 2024-05-28 DIAGNOSIS — O99.612 DISEASES OF THE DIGESTIVE SYSTEM COMPLICATING PREGNANCY, SECOND TRIMESTER: ICD-10-CM

## 2024-05-28 DIAGNOSIS — O36.8120 DECREASED FETAL MOVEMENTS, SECOND TRIMESTER, NOT APPLICABLE OR UNSPECIFIED: ICD-10-CM

## 2024-05-28 LAB
BILIRUB UR QL STRIP: NORMAL
CLARITY UR: CLEAR
GLUCOSE UR-MCNC: NORMAL
HCG UR QL: 0.2 EU/DL
HGB UR QL STRIP.AUTO: NORMAL
KETONES UR-MCNC: NORMAL
LEUKOCYTE ESTERASE UR QL STRIP: NORMAL
NITRITE UR QL STRIP: NORMAL
PH UR STRIP: 6
PROT UR STRIP-MCNC: NORMAL
SP GR UR STRIP: 1.03

## 2024-05-28 PROCEDURE — 76811 OB US DETAILED SNGL FETUS: CPT | Mod: 26

## 2024-05-28 PROCEDURE — 76817 TRANSVAGINAL US OBSTETRIC: CPT | Mod: 26

## 2024-05-28 PROCEDURE — 76811 OB US DETAILED SNGL FETUS: CPT | Mod: TC

## 2024-05-28 PROCEDURE — 0502F SUBSEQUENT PRENATAL CARE: CPT

## 2024-05-28 PROCEDURE — 76817 TRANSVAGINAL US OBSTETRIC: CPT | Mod: TC

## 2024-05-28 RX ORDER — ONDANSETRON 4 MG/1
4 TABLET, ORALLY DISINTEGRATING ORAL
Qty: 30 | Refills: 0 | Status: ACTIVE | COMMUNITY
Start: 2024-05-28 | End: 1900-01-01

## 2024-06-18 ENCOUNTER — LABORATORY RESULT (OUTPATIENT)
Age: 24
End: 2024-06-18

## 2024-06-20 ENCOUNTER — ASOB RESULT (OUTPATIENT)
Age: 24
End: 2024-06-20

## 2024-06-20 ENCOUNTER — APPOINTMENT (OUTPATIENT)
Dept: OBGYN | Facility: CLINIC | Age: 24
End: 2024-06-20
Payer: COMMERCIAL

## 2024-06-20 VITALS — WEIGHT: 216 LBS | HEIGHT: 62 IN | BODY MASS INDEX: 39.75 KG/M2 | TEMPERATURE: 97.2 F

## 2024-06-20 PROCEDURE — 0502F SUBSEQUENT PRENATAL CARE: CPT

## 2024-06-20 PROCEDURE — 76816 OB US FOLLOW-UP PER FETUS: CPT | Mod: TC

## 2024-06-20 PROCEDURE — 76816 OB US FOLLOW-UP PER FETUS: CPT | Mod: 26

## 2024-06-24 ENCOUNTER — NON-APPOINTMENT (OUTPATIENT)
Age: 24
End: 2024-06-24

## 2024-07-11 ENCOUNTER — APPOINTMENT (OUTPATIENT)
Dept: OBGYN | Facility: CLINIC | Age: 24
End: 2024-07-11
Payer: COMMERCIAL

## 2024-07-11 VITALS
HEART RATE: 86 BPM | HEIGHT: 62 IN | WEIGHT: 215 LBS | DIASTOLIC BLOOD PRESSURE: 96 MMHG | SYSTOLIC BLOOD PRESSURE: 122 MMHG | BODY MASS INDEX: 39.56 KG/M2

## 2024-07-11 DIAGNOSIS — Z3A.28 28 WEEKS GESTATION OF PREGNANCY: ICD-10-CM

## 2024-07-11 PROCEDURE — 0502F SUBSEQUENT PRENATAL CARE: CPT

## 2024-07-11 RX ORDER — IRON,CARBONYL/ASCORBIC ACID 65MG-125MG
65-125 TABLET, DELAYED RELEASE (ENTERIC COATED) ORAL
Qty: 30 | Refills: 6 | Status: ACTIVE | COMMUNITY
Start: 2024-07-11 | End: 1900-01-01

## 2024-07-12 LAB
BILIRUB UR QL STRIP: NORMAL
CLARITY UR: CLEAR
COLLECTION METHOD: NORMAL
GLUCOSE UR-MCNC: NORMAL
HCG UR QL: 0.2 EU/DL
HGB UR QL STRIP.AUTO: NORMAL
KETONES UR-MCNC: NORMAL
LEUKOCYTE ESTERASE UR QL STRIP: NORMAL
NITRITE UR QL STRIP: NORMAL
PH UR STRIP: 6.5
PROT UR STRIP-MCNC: NORMAL

## 2024-07-19 ENCOUNTER — APPOINTMENT (OUTPATIENT)
Dept: ANTEPARTUM | Facility: CLINIC | Age: 24
End: 2024-07-19
Payer: COMMERCIAL

## 2024-07-19 ENCOUNTER — ASOB RESULT (OUTPATIENT)
Age: 24
End: 2024-07-19

## 2024-07-19 VITALS — DIASTOLIC BLOOD PRESSURE: 80 MMHG | HEART RATE: 96 BPM | SYSTOLIC BLOOD PRESSURE: 118 MMHG | WEIGHT: 218 LBS

## 2024-07-19 PROCEDURE — 76816 OB US FOLLOW-UP PER FETUS: CPT

## 2024-07-19 PROCEDURE — 76819 FETAL BIOPHYS PROFIL W/O NST: CPT | Mod: 59

## 2024-07-20 ENCOUNTER — NON-APPOINTMENT (OUTPATIENT)
Age: 24
End: 2024-07-20

## 2024-07-24 ENCOUNTER — APPOINTMENT (OUTPATIENT)
Dept: PEDIATRIC CARDIOLOGY | Facility: CLINIC | Age: 24
End: 2024-07-24

## 2024-07-24 PROCEDURE — 76825 ECHO EXAM OF FETAL HEART: CPT

## 2024-07-24 PROCEDURE — 99205 OFFICE O/P NEW HI 60 MIN: CPT | Mod: 25

## 2024-07-24 PROCEDURE — 93325 DOPPLER ECHO COLOR FLOW MAPG: CPT

## 2024-07-24 PROCEDURE — 76827 ECHO EXAM OF FETAL HEART: CPT

## 2024-07-24 NOTE — DISCUSSION/SUMMARY
[FreeTextEntry1] : Normal fetal echocardiogram Reassurance Recommend routine prenatal care and delivery  Please do not hesitate to contact me if you have any questions.   Harjeet Oliva MD, MS, FAAP, FACC Attending Physician, Pediatric Cardiology [facility] Physician [address], [address] [place] [address] Office: [phone] Fax: [phone] Email: [email or web address]     I have spent 60 minutes of time on the encounter excluding separately reported services.

## 2024-07-24 NOTE — HISTORY OF PRESENT ILLNESS
[FreeTextEntry1] : Dear Dr. Julien,   I had the pleasure of seeing your patient, ROSIE KAPADIA, in my office today, 07/24/2024. She was referred to pediatric cardiology for fetal echocardiogram.   GA 30w3d; limited view of heart. ROSIE has been doing well from a clinical standpoint. There is no family history of congenital heart disease.

## 2024-07-25 ENCOUNTER — APPOINTMENT (OUTPATIENT)
Dept: OBGYN | Facility: CLINIC | Age: 24
End: 2024-07-25
Payer: COMMERCIAL

## 2024-07-25 VITALS
SYSTOLIC BLOOD PRESSURE: 120 MMHG | HEIGHT: 62 IN | DIASTOLIC BLOOD PRESSURE: 79 MMHG | BODY MASS INDEX: 39.93 KG/M2 | WEIGHT: 217 LBS | HEART RATE: 86 BPM

## 2024-07-25 DIAGNOSIS — Z3A.30 30 WEEKS GESTATION OF PREGNANCY: ICD-10-CM

## 2024-07-25 LAB
BILIRUB UR QL STRIP: NORMAL
CLARITY UR: CLEAR
COLLECTION METHOD: NORMAL
GLUCOSE UR-MCNC: NORMAL
HCG UR QL: 0.2 EU/DL
HGB UR QL STRIP.AUTO: NORMAL
KETONES UR-MCNC: NORMAL
LEUKOCYTE ESTERASE UR QL STRIP: NORMAL
NITRITE UR QL STRIP: NORMAL
PH UR STRIP: 6.5
PROT UR STRIP-MCNC: NORMAL
SP GR UR STRIP: 1.02

## 2024-07-25 PROCEDURE — 0502F SUBSEQUENT PRENATAL CARE: CPT

## 2024-08-07 ENCOUNTER — APPOINTMENT (OUTPATIENT)
Dept: OBGYN | Facility: CLINIC | Age: 24
End: 2024-08-07

## 2024-08-07 ENCOUNTER — NON-APPOINTMENT (OUTPATIENT)
Age: 24
End: 2024-08-07

## 2024-08-07 PROBLEM — Z3A.32 32 WEEKS GESTATION OF PREGNANCY: Status: ACTIVE | Noted: 2024-08-07

## 2024-08-07 PROCEDURE — 0502F SUBSEQUENT PRENATAL CARE: CPT

## 2024-08-14 ENCOUNTER — NON-APPOINTMENT (OUTPATIENT)
Age: 24
End: 2024-08-14

## 2024-08-17 ENCOUNTER — OUTPATIENT (OUTPATIENT)
Dept: INPATIENT UNIT | Facility: HOSPITAL | Age: 24
LOS: 1 days | Discharge: HOME | End: 2024-08-17
Payer: COMMERCIAL

## 2024-08-17 ENCOUNTER — NON-APPOINTMENT (OUTPATIENT)
Age: 24
End: 2024-08-17

## 2024-08-17 VITALS — HEART RATE: 96 BPM | SYSTOLIC BLOOD PRESSURE: 104 MMHG | DIASTOLIC BLOOD PRESSURE: 57 MMHG

## 2024-08-17 VITALS — HEART RATE: 100 BPM | SYSTOLIC BLOOD PRESSURE: 104 MMHG | DIASTOLIC BLOOD PRESSURE: 60 MMHG

## 2024-08-17 DIAGNOSIS — Z98.890 OTHER SPECIFIED POSTPROCEDURAL STATES: Chronic | ICD-10-CM

## 2024-08-17 DIAGNOSIS — O26.899 OTHER SPECIFIED PREGNANCY RELATED CONDITIONS, UNSPECIFIED TRIMESTER: ICD-10-CM

## 2024-08-17 LAB
APTT BLD: 30.5 SEC — SIGNIFICANT CHANGE UP (ref 27–39.2)
BASOPHILS # BLD AUTO: 0.02 K/UL — SIGNIFICANT CHANGE UP (ref 0–0.2)
BASOPHILS NFR BLD AUTO: 0.3 % — SIGNIFICANT CHANGE UP (ref 0–1)
BLD GP AB SCN SERPL QL: SIGNIFICANT CHANGE UP
EOSINOPHIL # BLD AUTO: 0.04 K/UL — SIGNIFICANT CHANGE UP (ref 0–0.7)
EOSINOPHIL NFR BLD AUTO: 0.5 % — SIGNIFICANT CHANGE UP (ref 0–8)
FIBRINOGEN PPP-MCNC: 370 MG/DL — SIGNIFICANT CHANGE UP (ref 200–435)
HCT VFR BLD CALC: 32.6 % — LOW (ref 37–47)
HGB BLD-MCNC: 10.5 G/DL — LOW (ref 12–16)
IMM GRANULOCYTES NFR BLD AUTO: 0.7 % — HIGH (ref 0.1–0.3)
INR BLD: 0.92 RATIO — SIGNIFICANT CHANGE UP (ref 0.65–1.3)
LYMPHOCYTES # BLD AUTO: 1.37 K/UL — SIGNIFICANT CHANGE UP (ref 1.2–3.4)
LYMPHOCYTES # BLD AUTO: 18.3 % — LOW (ref 20.5–51.1)
MCHC RBC-ENTMCNC: 23.1 PG — LOW (ref 27–31)
MCHC RBC-ENTMCNC: 32.2 G/DL — SIGNIFICANT CHANGE UP (ref 32–37)
MCV RBC AUTO: 71.6 FL — LOW (ref 81–99)
MONOCYTES # BLD AUTO: 0.7 K/UL — HIGH (ref 0.1–0.6)
MONOCYTES NFR BLD AUTO: 9.4 % — HIGH (ref 1.7–9.3)
NEUTROPHILS # BLD AUTO: 5.29 K/UL — SIGNIFICANT CHANGE UP (ref 1.4–6.5)
NEUTROPHILS NFR BLD AUTO: 70.8 % — SIGNIFICANT CHANGE UP (ref 42.2–75.2)
NRBC # BLD: 0 /100 WBCS — SIGNIFICANT CHANGE UP (ref 0–0)
NRBC BLD-RTO: 0 /100 WBCS — SIGNIFICANT CHANGE UP (ref 0–0)
PLATELET # BLD AUTO: 247 K/UL — SIGNIFICANT CHANGE UP (ref 130–400)
PMV BLD: 11.7 FL — HIGH (ref 7.4–10.4)
PROTHROM AB SERPL-ACNC: 10.5 SEC — SIGNIFICANT CHANGE UP (ref 9.95–12.87)
RBC # BLD: 4.55 M/UL — SIGNIFICANT CHANGE UP (ref 4.2–5.4)
RBC # FLD: 15.1 % — HIGH (ref 11.5–14.5)
WBC # BLD: 7.47 K/UL — SIGNIFICANT CHANGE UP (ref 4.8–10.8)
WBC # FLD AUTO: 7.47 K/UL — SIGNIFICANT CHANGE UP (ref 4.8–10.8)

## 2024-08-17 PROCEDURE — G0316 PROLONG INPT EVAL ADD15 M: CPT | Mod: 25

## 2024-08-17 PROCEDURE — 86850 RBC ANTIBODY SCREEN: CPT

## 2024-08-17 PROCEDURE — 85384 FIBRINOGEN ACTIVITY: CPT

## 2024-08-17 PROCEDURE — 86901 BLOOD TYPING SEROLOGIC RH(D): CPT

## 2024-08-17 PROCEDURE — 99214 OFFICE O/P EST MOD 30 MIN: CPT

## 2024-08-17 PROCEDURE — 85025 COMPLETE CBC W/AUTO DIFF WBC: CPT

## 2024-08-17 PROCEDURE — 85730 THROMBOPLASTIN TIME PARTIAL: CPT

## 2024-08-17 PROCEDURE — 96361 HYDRATE IV INFUSION ADD-ON: CPT

## 2024-08-17 PROCEDURE — 86900 BLOOD TYPING SEROLOGIC ABO: CPT

## 2024-08-17 PROCEDURE — 99418 PROLNG IP/OBS E/M EA 15 MIN: CPT | Mod: 25

## 2024-08-17 PROCEDURE — 59025 FETAL NON-STRESS TEST: CPT | Mod: 26

## 2024-08-17 PROCEDURE — 99223 1ST HOSP IP/OBS HIGH 75: CPT | Mod: 25

## 2024-08-17 PROCEDURE — 76815 OB US LIMITED FETUS(S): CPT | Mod: 26

## 2024-08-17 PROCEDURE — 85610 PROTHROMBIN TIME: CPT

## 2024-08-17 PROCEDURE — 96360 HYDRATION IV INFUSION INIT: CPT

## 2024-08-17 PROCEDURE — 36415 COLL VENOUS BLD VENIPUNCTURE: CPT

## 2024-08-17 RX ORDER — SODIUM CHLORIDE 9 G/1000ML
1000 INJECTION, SOLUTION INTRAVENOUS
Refills: 0 | Status: DISCONTINUED | OUTPATIENT
Start: 2024-08-17 | End: 2024-08-17
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
## 2024-08-20 DIAGNOSIS — O99.283 ENDOCRINE, NUTRITIONAL AND METABOLIC DISEASES COMPLICATING PREGNANCY, THIRD TRIMESTER: ICD-10-CM

## 2024-08-20 DIAGNOSIS — E28.2 POLYCYSTIC OVARIAN SYNDROME: ICD-10-CM

## 2024-08-20 DIAGNOSIS — Z3A.33 33 WEEKS GESTATION OF PREGNANCY: ICD-10-CM

## 2024-08-20 DIAGNOSIS — O40.3XX0 POLYHYDRAMNIOS, THIRD TRIMESTER, NOT APPLICABLE OR UNSPECIFIED: ICD-10-CM

## 2024-08-20 DIAGNOSIS — O26.893 OTHER SPECIFIED PREGNANCY RELATED CONDITIONS, THIRD TRIMESTER: ICD-10-CM
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
## 2024-08-20 DIAGNOSIS — R10.9 UNSPECIFIED ABDOMINAL PAIN: ICD-10-CM

## 2024-08-22 ENCOUNTER — APPOINTMENT (OUTPATIENT)
Dept: OBGYN | Facility: CLINIC | Age: 24
End: 2024-08-22
Payer: COMMERCIAL

## 2024-08-22 ENCOUNTER — NON-APPOINTMENT (OUTPATIENT)
Age: 24
End: 2024-08-22

## 2024-08-22 VITALS
WEIGHT: 223 LBS | HEIGHT: 61 IN | SYSTOLIC BLOOD PRESSURE: 107 MMHG | DIASTOLIC BLOOD PRESSURE: 74 MMHG | BODY MASS INDEX: 42.1 KG/M2

## 2024-08-22 DIAGNOSIS — Z3A.34 34 WEEKS GESTATION OF PREGNANCY: ICD-10-CM

## 2024-08-22 PROBLEM — Z87.42 PERSONAL HISTORY OF OTHER DISEASES OF THE FEMALE GENITAL TRACT: Chronic | Status: ACTIVE | Noted: 2024-08-17

## 2024-08-22 PROCEDURE — 76816 OB US FOLLOW-UP PER FETUS: CPT

## 2024-08-22 PROCEDURE — 0502F SUBSEQUENT PRENATAL CARE: CPT

## 2024-08-22 PROCEDURE — 76819 FETAL BIOPHYS PROFIL W/O NST: CPT | Mod: 59

## 2024-08-26 LAB
APPEARANCE: CLEAR
BILIRUBIN URINE: NEGATIVE
BLOOD URINE: NEGATIVE
COLOR: YELLOW
GLUCOSE QUALITATIVE U: NEGATIVE
KETONES URINE: NEGATIVE
LEUKOCYTE ESTERASE URINE: ABNORMAL
NITRITE URINE: NEGATIVE
PH URINE: 6.5
PROTEIN URINE: NEGATIVE
SPECIFIC GRAVITY URINE: >=1.03
UROBILINOGEN URINE: 0.2 (ref 0.2–?)

## 2024-08-29 ENCOUNTER — APPOINTMENT (OUTPATIENT)
Dept: OBGYN | Facility: CLINIC | Age: 24
End: 2024-08-29
Payer: COMMERCIAL

## 2024-08-29 VITALS
HEIGHT: 62 IN | WEIGHT: 221 LBS | SYSTOLIC BLOOD PRESSURE: 124 MMHG | BODY MASS INDEX: 40.67 KG/M2 | DIASTOLIC BLOOD PRESSURE: 75 MMHG

## 2024-08-29 DIAGNOSIS — Z3A.35 35 WEEKS GESTATION OF PREGNANCY: ICD-10-CM

## 2024-08-29 PROCEDURE — 0502F SUBSEQUENT PRENATAL CARE: CPT

## 2024-08-29 PROCEDURE — 76819 FETAL BIOPHYS PROFIL W/O NST: CPT

## 2024-08-31 LAB
BACTERIA UR CULT: NORMAL
C TRACH RRNA SPEC QL NAA+PROBE: NOT DETECTED
GP B STREP DNA SPEC QL NAA+PROBE: NOT DETECTED
N GONORRHOEA RRNA SPEC QL NAA+PROBE: NOT DETECTED
SOURCE AMPLIFICATION: NORMAL
SOURCE GBS: NORMAL

## 2024-09-03 ENCOUNTER — APPOINTMENT (OUTPATIENT)
Dept: OBGYN | Facility: CLINIC | Age: 24
End: 2024-09-03
Payer: COMMERCIAL

## 2024-09-03 VITALS — HEIGHT: 62 IN | BODY MASS INDEX: 40.85 KG/M2 | WEIGHT: 222 LBS | TEMPERATURE: 97.2 F

## 2024-09-03 LAB
APPEARANCE: CLEAR
BILIRUBIN URINE: NEGATIVE
BLOOD URINE: NEGATIVE
COLOR: YELLOW
GLUCOSE QUALITATIVE U: NEGATIVE
KETONES URINE: NEGATIVE
LEUKOCYTE ESTERASE URINE: ABNORMAL
NITRITE URINE: NEGATIVE
PH URINE: 7
PROTEIN URINE: NEGATIVE
SPECIFIC GRAVITY URINE: 1.02
UROBILINOGEN URINE: 0.2 (ref 0.2–?)

## 2024-09-03 PROCEDURE — 76819 FETAL BIOPHYS PROFIL W/O NST: CPT

## 2024-09-03 PROCEDURE — 0502F SUBSEQUENT PRENATAL CARE: CPT

## 2024-09-12 ENCOUNTER — APPOINTMENT (OUTPATIENT)
Dept: OBGYN | Facility: CLINIC | Age: 24
End: 2024-09-12
Payer: COMMERCIAL

## 2024-09-12 VITALS
HEIGHT: 62 IN | DIASTOLIC BLOOD PRESSURE: 63 MMHG | WEIGHT: 225 LBS | SYSTOLIC BLOOD PRESSURE: 93 MMHG | BODY MASS INDEX: 41.41 KG/M2

## 2024-09-12 PROCEDURE — 0502F SUBSEQUENT PRENATAL CARE: CPT

## 2024-09-12 PROCEDURE — 76819 FETAL BIOPHYS PROFIL W/O NST: CPT

## 2024-09-13 ENCOUNTER — NON-APPOINTMENT (OUTPATIENT)
Age: 24
End: 2024-09-13

## 2024-09-19 ENCOUNTER — APPOINTMENT (OUTPATIENT)
Dept: OBGYN | Facility: CLINIC | Age: 24
End: 2024-09-19
Payer: COMMERCIAL

## 2024-09-19 VITALS
WEIGHT: 225 LBS | HEIGHT: 62 IN | DIASTOLIC BLOOD PRESSURE: 78 MMHG | SYSTOLIC BLOOD PRESSURE: 138 MMHG | HEART RATE: 78 BPM | BODY MASS INDEX: 41.41 KG/M2

## 2024-09-19 DIAGNOSIS — Z34.90 ENCOUNTER FOR SUPERVISION OF NORMAL PREGNANCY, UNSPECIFIED, UNSPECIFIED TRIMESTER: ICD-10-CM

## 2024-09-19 LAB
BILIRUB UR QL STRIP: NORMAL
CLARITY UR: CLEAR
COLLECTION METHOD: NORMAL
GLUCOSE UR-MCNC: NORMAL
HCG UR QL: 0.2 EU/DL
HGB UR QL STRIP.AUTO: NORMAL
KETONES UR-MCNC: NORMAL
LEUKOCYTE ESTERASE UR QL STRIP: NORMAL
NITRITE UR QL STRIP: NORMAL
PH UR STRIP: 6.5
PROT UR STRIP-MCNC: 30
SP GR UR STRIP: 1.03

## 2024-09-19 PROCEDURE — 81003 URINALYSIS AUTO W/O SCOPE: CPT | Mod: NC,QW

## 2024-09-19 PROCEDURE — 76816 OB US FOLLOW-UP PER FETUS: CPT

## 2024-09-19 PROCEDURE — 0502F SUBSEQUENT PRENATAL CARE: CPT

## 2024-09-19 PROCEDURE — 76819 FETAL BIOPHYS PROFIL W/O NST: CPT | Mod: 59

## 2024-09-22 ENCOUNTER — INPATIENT (INPATIENT)
Facility: HOSPITAL | Age: 24
LOS: 2 days | Discharge: HOME | DRG: 833 | End: 2024-09-25
Attending: OBSTETRICS & GYNECOLOGY | Admitting: OBSTETRICS & GYNECOLOGY
Payer: COMMERCIAL

## 2024-09-22 VITALS
DIASTOLIC BLOOD PRESSURE: 71 MMHG | TEMPERATURE: 98 F | SYSTOLIC BLOOD PRESSURE: 125 MMHG | HEART RATE: 78 BPM | RESPIRATION RATE: 16 BRPM

## 2024-09-22 DIAGNOSIS — O26.893 OTHER SPECIFIED PREGNANCY RELATED CONDITIONS, THIRD TRIMESTER: ICD-10-CM

## 2024-09-22 DIAGNOSIS — Z98.890 OTHER SPECIFIED POSTPROCEDURAL STATES: Chronic | ICD-10-CM

## 2024-09-22 DIAGNOSIS — O26.899 OTHER SPECIFIED PREGNANCY RELATED CONDITIONS, UNSPECIFIED TRIMESTER: ICD-10-CM

## 2024-09-22 LAB
APPEARANCE UR: ABNORMAL
BASOPHILS # BLD AUTO: 0.03 K/UL — SIGNIFICANT CHANGE UP (ref 0–0.2)
BASOPHILS NFR BLD AUTO: 0.3 % — SIGNIFICANT CHANGE UP (ref 0–1)
BILIRUB UR-MCNC: NEGATIVE — SIGNIFICANT CHANGE UP
COLOR SPEC: YELLOW — SIGNIFICANT CHANGE UP
DIFF PNL FLD: NEGATIVE — SIGNIFICANT CHANGE UP
EOSINOPHIL # BLD AUTO: 0.04 K/UL — SIGNIFICANT CHANGE UP (ref 0–0.7)
EOSINOPHIL NFR BLD AUTO: 0.3 % — SIGNIFICANT CHANGE UP (ref 0–8)
GLUCOSE UR QL: NEGATIVE MG/DL — SIGNIFICANT CHANGE UP
HCT VFR BLD CALC: 34.4 % — LOW (ref 37–47)
HGB BLD-MCNC: 11.1 G/DL — LOW (ref 12–16)
IMM GRANULOCYTES NFR BLD AUTO: 0.6 % — HIGH (ref 0.1–0.3)
KETONES UR-MCNC: NEGATIVE MG/DL — SIGNIFICANT CHANGE UP
LEUKOCYTE ESTERASE UR-ACNC: NEGATIVE — SIGNIFICANT CHANGE UP
LYMPHOCYTES # BLD AUTO: 1.71 K/UL — SIGNIFICANT CHANGE UP (ref 1.2–3.4)
LYMPHOCYTES # BLD AUTO: 14.4 % — LOW (ref 20.5–51.1)
MCHC RBC-ENTMCNC: 23.1 PG — LOW (ref 27–31)
MCHC RBC-ENTMCNC: 32.3 G/DL — SIGNIFICANT CHANGE UP (ref 32–37)
MCV RBC AUTO: 71.7 FL — LOW (ref 81–99)
MONOCYTES # BLD AUTO: 0.7 K/UL — HIGH (ref 0.1–0.6)
MONOCYTES NFR BLD AUTO: 5.9 % — SIGNIFICANT CHANGE UP (ref 1.7–9.3)
NEUTROPHILS # BLD AUTO: 9.36 K/UL — HIGH (ref 1.4–6.5)
NEUTROPHILS NFR BLD AUTO: 78.5 % — HIGH (ref 42.2–75.2)
NITRITE UR-MCNC: NEGATIVE — SIGNIFICANT CHANGE UP
NRBC # BLD: 0 /100 WBCS — SIGNIFICANT CHANGE UP (ref 0–0)
PH UR: 7 — SIGNIFICANT CHANGE UP (ref 5–8)
PLATELET # BLD AUTO: 257 K/UL — SIGNIFICANT CHANGE UP (ref 130–400)
PMV BLD: 12.5 FL — HIGH (ref 7.4–10.4)
PRENATAL SYPHILIS TEST: SIGNIFICANT CHANGE UP
PROT UR-MCNC: SIGNIFICANT CHANGE UP MG/DL
RBC # BLD: 4.8 M/UL — SIGNIFICANT CHANGE UP (ref 4.2–5.4)
RBC # FLD: 15.2 % — HIGH (ref 11.5–14.5)
SP GR SPEC: 1.02 — SIGNIFICANT CHANGE UP (ref 1–1.03)
UROBILINOGEN FLD QL: 0.2 MG/DL — SIGNIFICANT CHANGE UP (ref 0.2–1)
WBC # BLD: 11.91 K/UL — HIGH (ref 4.8–10.8)
WBC # FLD AUTO: 11.91 K/UL — HIGH (ref 4.8–10.8)

## 2024-09-22 PROCEDURE — 86900 BLOOD TYPING SEROLOGIC ABO: CPT

## 2024-09-22 PROCEDURE — 81001 URINALYSIS AUTO W/SCOPE: CPT

## 2024-09-22 PROCEDURE — 86901 BLOOD TYPING SEROLOGIC RH(D): CPT

## 2024-09-22 PROCEDURE — 86850 RBC ANTIBODY SCREEN: CPT

## 2024-09-22 PROCEDURE — 83615 LACTATE (LD) (LDH) ENZYME: CPT

## 2024-09-22 PROCEDURE — 86592 SYPHILIS TEST NON-TREP QUAL: CPT

## 2024-09-22 PROCEDURE — 36415 COLL VENOUS BLD VENIPUNCTURE: CPT

## 2024-09-22 PROCEDURE — 80354 DRUG SCREENING FENTANYL: CPT

## 2024-09-22 PROCEDURE — 80053 COMPREHEN METABOLIC PANEL: CPT

## 2024-09-22 PROCEDURE — 59025 FETAL NON-STRESS TEST: CPT

## 2024-09-22 PROCEDURE — 85025 COMPLETE CBC W/AUTO DIFF WBC: CPT

## 2024-09-22 PROCEDURE — 80307 DRUG TEST PRSMV CHEM ANLYZR: CPT

## 2024-09-22 PROCEDURE — 84550 ASSAY OF BLOOD/URIC ACID: CPT

## 2024-09-22 RX ORDER — MORPHINE SULFATE 30 MG/1
2 TABLET, FILM COATED, EXTENDED RELEASE ORAL ONCE
Refills: 0 | Status: DISCONTINUED | OUTPATIENT
Start: 2024-09-22 | End: 2024-09-23

## 2024-09-22 RX ORDER — NALOXONE HYDROCHLORIDE 0.4 MG/ML
0.1 INJECTION, SOLUTION INTRAMUSCULAR; INTRAVENOUS; SUBCUTANEOUS
Refills: 0 | Status: DISCONTINUED | OUTPATIENT
Start: 2024-09-22 | End: 2024-09-23

## 2024-09-22 RX ORDER — MORPHINE SULFATE 30 MG/1
4 TABLET, FILM COATED, EXTENDED RELEASE ORAL ONCE
Refills: 0 | Status: DISCONTINUED | OUTPATIENT
Start: 2024-09-22 | End: 2024-09-22

## 2024-09-22 RX ORDER — ONDANSETRON HCL/PF 4 MG/2 ML
4 VIAL (ML) INJECTION EVERY 6 HOURS
Refills: 0 | Status: DISCONTINUED | OUTPATIENT
Start: 2024-09-22 | End: 2024-09-23

## 2024-09-22 RX ORDER — HEPARIN SOD,PORK IN 0.45% NACL 5K/1000 ML
250 INTRAVENOUS SOLUTION INTRAVENOUS
Refills: 0 | Status: DISCONTINUED | OUTPATIENT
Start: 2024-09-22 | End: 2024-09-23

## 2024-09-22 RX ORDER — OXYTOCIN/RINGER'S LACTATE 20/500ML
167 PLASTIC BAG, INJECTION (ML) INTRAVENOUS
Qty: 30 | Refills: 0 | Status: DISCONTINUED | OUTPATIENT
Start: 2024-09-22 | End: 2024-09-23

## 2024-09-22 RX ORDER — OXYTOCIN/RINGER'S LACTATE 20/500ML
2 PLASTIC BAG, INJECTION (ML) INTRAVENOUS
Qty: 30 | Refills: 0 | Status: DISCONTINUED | OUTPATIENT
Start: 2024-09-22 | End: 2024-09-23

## 2024-09-22 RX ORDER — CHLORHEXIDINE GLUCONATE ORAL RINSE 1.2 MG/ML
1 SOLUTION DENTAL DAILY
Refills: 0 | Status: DISCONTINUED | OUTPATIENT
Start: 2024-09-22 | End: 2024-09-23

## 2024-09-22 RX ORDER — SODIUM CHLORIDE IRRIG SOLUTION 0.9 %
1000 SOLUTION, IRRIGATION IRRIGATION
Refills: 0 | Status: DISCONTINUED | OUTPATIENT
Start: 2024-09-22 | End: 2024-09-23

## 2024-09-22 RX ADMIN — Medication 2 MILLIUNIT(S)/MIN: at 15:02

## 2024-09-22 RX ADMIN — Medication 125 MILLILITER(S): at 15:01

## 2024-09-22 RX ADMIN — MORPHINE SULFATE 4 MILLIGRAM(S): 30 TABLET, FILM COATED, EXTENDED RELEASE ORAL at 17:03

## 2024-09-22 NOTE — PROCEDURE NOTE - ADDITIONAL PROCEDURE DETAILS
Thorough discussion of patient's history was had with patient. Discussed risks of epidural/spinal, including PDPH, inadequate analgesia occasionally requiring epidural catheter replacement/general anesthesia, bleeding, infection, spinal cord injury, hypotension, and nausea. Patient expressed understanding of these risks, signed informed consent, and wishes to proceed with the procedure.    Patient sitting. Lumbar epidural performed. Standard ASA monitors including FHR used. Sterile gloves, Chloraprep used. 1% lidocaine was used for local infiltration. 17g Tuohy used to achieve SOHAM at 9 cm. Catheter secured in place at 14 cm. Tuohy needle removed. Negative aspiration. Test dose consisting of 3cc 1.5% lidocaine with epinephrine was negative. Patient tolerated procedure and was hemodynamically stable throughout. Thorough discussion of patient's history was had with patient. Discussed risks of epidural/spinal, including PDPH, inadequate analgesia occasionally requiring epidural catheter replacement/general anesthesia, bleeding, infection, spinal cord injury, hypotension, and nausea. Patient expressed understanding of these risks, signed informed consent, and wishes to proceed with the procedure.    Patient sitting. Lumbar epidural performed. Standard ASA monitors including FHR used. Sterile gloves, Chloraprep used. 1% lidocaine was used for local infiltration. 17g Tuohy used to achieve SOHAM at 9 cm. Catheter secured in place at 14 cm. Tuohy needle removed. Negative aspiration. Test dose consisting of 3cc 1.5% lidocaine with epinephrine was negative. Patient tolerated procedure and was hemodynamically stable throughout.    1530: Called to beside and informed patient feels that epidural is unilateral (only right sided relief). Offered to replace epidural, patient agreed and epidural catheter was removed intact. Epidural replaced without complication. Thorough discussion of patient's history was had with patient. Discussed risks of epidural/spinal, including PDPH, inadequate analgesia occasionally requiring epidural catheter replacement/general anesthesia, bleeding, infection, spinal cord injury, hypotension, and nausea. Patient expressed understanding of these risks, signed informed consent, and wishes to proceed with the procedure.    Patient sitting. Lumbar epidural performed. Standard ASA monitors including FHR used. Sterile gloves, Chloraprep used. 1% lidocaine was used for local infiltration. 17g Tuohy used to achieve SOHAM at 9 cm. Catheter secured in place at 14 cm. Tuohy needle removed. Negative aspiration. Test dose consisting of 3cc 1.5% lidocaine with epinephrine was negative. Patient tolerated procedure and was hemodynamically stable throughout.    1530: Called to beside and informed patient feels that epidural is unilateral (only right sided relief). Offered to replace epidural, patient agreed and epidural catheter was removed intact. Epidural replaced without complication.    1630: Called to bedside and informed that patient does not feel relief from epidural. Called for help and had second anesthesia provider attempt epidural placement x2 without success. Decision made with OBGYN team and patient to try IV pain medicine and perhaps attempt epidural again later this evening. Thorough discussion of patient's history was had with patient. Discussed risks of epidural/spinal, including PDPH, inadequate analgesia occasionally requiring epidural catheter replacement/general anesthesia, bleeding, infection, spinal cord injury, hypotension, and nausea. Patient expressed understanding of these risks, signed informed consent, and wishes to proceed with the procedure.    Patient sitting. Lumbar epidural performed. Standard ASA monitors including FHR used. Sterile gloves, Chloraprep used. 1% lidocaine was used for local infiltration. 17g Tuohy used to achieve SOHAM at 9 cm. Catheter secured in place at 14 cm. Tuohy needle removed. Negative aspiration. Test dose consisting of 3cc 1.5% lidocaine with epinephrine was negative. Patient tolerated procedure and was hemodynamically stable throughout.    1530: Called to beside and informed patient feels that epidural is unilateral (only right sided relief). Offered to replace epidural, patient agreed and epidural catheter was removed intact. Epidural replaced without complication.    1630: Called to bedside and informed that patient does not feel relief from epidural. Called for help and had second anesthesia provider attempt epidural placement x2 without success. Decision made with OBGYN team and patient to try IV pain medicine and perhaps attempt epidural again later this evening.    1945: Thorough discussion of patient's history.  Discussed risks of epidural, including PDPH, inadequate analgesia occasionally requiring epidural catheter replacement, bleeding, infection and spinal cord injury.  Patient expressed understanding of these risks, signed informed consent and wishes to proceed with epidural catheter insertion.  Lumbar epidural performed at L3-4. Standard ASA monitors including BP, pulse oximetry, FHR. Sterile gloves, mask, chlorhexidine prep. 1% lidocaine for local infiltration. 17g Tuohy. SOHAM to saline at 6cm.  Epidural catheter threaded easily to 11cm. Tuohy needle removed. Catheter secured in place. Aspiration negative for blood/CSF. Test dose consisting of 3ml 1.5% lidocaine with epinephrine was negative. 10mL 0.125% bupi given via epidural infusion pump after negative aspiration. Patient tolerated procedure, was hemodynamically stable throughout and did not complain of pain or paresthesias.  Epidural infusion consisting of 250ml 0.0625% bupivacaine with fentanyl 2mcg/ml infusing at 10 mL/hour with patient controlled bolus of 5cc q 15 minutes as needed for pain. Thorough discussion of patient's history was had with patient. Discussed risks of epidural/spinal, including PDPH, inadequate analgesia occasionally requiring epidural catheter replacement/general anesthesia, bleeding, infection, spinal cord injury, hypotension, and nausea. Patient expressed understanding of these risks, signed informed consent, and wishes to proceed with the procedure.    Patient sitting. Lumbar epidural performed. Standard ASA monitors including FHR used. Sterile gloves, Chloraprep used. 1% lidocaine was used for local infiltration. 17g Tuohy used to achieve SOHAM at 9 cm. Catheter secured in place at 14 cm. Tuohy needle removed. Negative aspiration. Test dose consisting of 3cc 1.5% lidocaine with epinephrine was negative. Patient tolerated procedure and was hemodynamically stable throughout.    1530: Called to beside and informed patient feels that epidural is unilateral (only right sided relief). Offered to replace epidural, patient agreed and epidural catheter was removed intact. Epidural replaced without complication.    1630: Called to bedside and informed that patient does not feel relief from epidural. Called for help and had second anesthesia provider attempt epidural placement x2 without success. Decision made with OBGYN team and patient to try IV pain medicine and perhaps attempt epidural again later this evening.    1945: Thorough discussion of patient's history.  Discussed risks of epidural, including PDPH, inadequate analgesia occasionally requiring epidural catheter replacement, bleeding, infection and spinal cord injury.  Patient expressed understanding of these risks, signed informed consent and wishes to proceed with epidural catheter insertion.  Lumbar epidural performed at L3-4. Standard ASA monitors including BP, pulse oximetry, FHR. Sterile gloves, mask, chlorhexidine prep. 1% lidocaine for local infiltration. 17g Tuohy. SOHAM to saline at 6cm.  Epidural catheter threaded easily to 11cm. Tuohy needle removed. Catheter secured in place. Aspiration negative for blood/CSF. Test dose consisting of 3ml 1.5% lidocaine with epinephrine was negative. 10mL 0.125% bupi given via epidural infusion pump after negative aspiration. Patient tolerated procedure, was hemodynamically stable throughout and did not complain of pain or paresthesias.  Epidural infusion consisting of 250ml 0.0625% bupivacaine with fentanyl 2mcg/ml infusing at 10 mL/hour with patient controlled bolus of 5cc q 15 minutes as needed for pain.    0015 dilation 7-8, c/o pain, fent. 100mcg given    0050 no pain relieve, 2% lido 6cc in fractional dose given, infusion rate increased from 10 cc/ hour to 12 Thorough discussion of patient's history was had with patient. Discussed risks of epidural/spinal, including PDPH, inadequate analgesia occasionally requiring epidural catheter replacement/general anesthesia, bleeding, infection, spinal cord injury, hypotension, and nausea. Patient expressed understanding of these risks, signed informed consent, and wishes to proceed with the procedure.    Patient sitting. Lumbar epidural performed. Standard ASA monitors including FHR used. Sterile gloves, Chloraprep used. 1% lidocaine was used for local infiltration. 17g Tuohy used to achieve SOHAM at 9 cm. Catheter secured in place at 14 cm. Tuohy needle removed. Negative aspiration. Test dose consisting of 3cc 1.5% lidocaine with epinephrine was negative. Patient tolerated procedure and was hemodynamically stable throughout.    1530: Called to beside and informed patient feels that epidural is unilateral (only right sided relief). Offered to replace epidural, patient agreed and epidural catheter was removed intact. Epidural replaced without complication.    1630: Called to bedside and informed that patient does not feel relief from epidural. Called for help and had second anesthesia provider attempt epidural placement x2 without success. Decision made with OBGYN team and patient to try IV pain medicine and perhaps attempt epidural again later this evening.    1945: Thorough discussion of patient's history.  Discussed risks of epidural, including PDPH, inadequate analgesia occasionally requiring epidural catheter replacement, bleeding, infection and spinal cord injury.  Patient expressed understanding of these risks, signed informed consent and wishes to proceed with epidural catheter insertion.  Lumbar epidural performed at L3-4. Standard ASA monitors including BP, pulse oximetry, FHR. Sterile gloves, mask, chlorhexidine prep. 1% lidocaine for local infiltration. 17g Tuohy. SOHAM to saline at 6cm.  Epidural catheter threaded easily to 11cm. Tuohy needle removed. Catheter secured in place. Aspiration negative for blood/CSF. Test dose consisting of 3ml 1.5% lidocaine with epinephrine was negative. 10mL 0.125% bupi given via epidural infusion pump after negative aspiration. Patient tolerated procedure, was hemodynamically stable throughout and did not complain of pain or paresthesias.  Epidural infusion consisting of 250ml 0.0625% bupivacaine with fentanyl 2mcg/ml infusing at 10 mL/hour with patient controlled bolus of 5cc q 15 minutes as needed for pain.      0015 dilation 7-8, c/o pain, fent. 100mcg given    0050 no pain relieve, 2% lido 6cc in fractional dose given, infusion rate increased from 10 cc/ hour to 12  Discussed with OB and patient on increased risk of C/S prior to top off    0139 to OR for Category III tracing

## 2024-09-22 NOTE — PROGRESS NOTE ADULT - ASSESSMENT
A/P:   24y  @ 39w0d, GBS neg, in early labor. s/p epidural, s/p AROM, in labor progressing well.  -Continue current management   -Pain management prn  -Continuous EFM/toco  -IV fluid hydration, CLD  -Reevaluate

## 2024-09-22 NOTE — PROCEDURE NOTE - NSPERFORMEDBY_GEN_A_CORE
MOCA 10/30- deficits in all domains except naming (3/6 orientation, 0/5 recall), MME 3/3 ID, 3/3 recall with hint  Pt's current cognitive level is consistent with mild dementia  Pt is independent w adl/dependent w iadls.  Lives with , family supportive  Mobility:  Use of AD(walker and cane), has life alert  Continue to stay active.  Current activity level:  fair.  Participates in some social, cognitive, physical activity.  Monitor for changes in mood, sleep, pain control.  Notify provider with any concerns.  Medication review:  Seems appropriate for current conditions.  Pt/ have previously been counseled on potential SE of benzo.  Check with pharmacist/provider before starting any new OTC/prescription medications for potential cognitive side effects  Optimize all acute and chronic conditions.  Continue to follow-up with PCP and specialist as directed for management  Safety concerns:  None at this time  Caregiver stress:  Communication with pt more difficult, does not feel pt is able to adequately express her needs due to aphasia  Ensure adequate hydration, good nutrition  Goal: to keep at home, as long as possible.   still does not feel need for HHA yet, but dgt would like him to consider.    Anesthesiologist

## 2024-09-22 NOTE — OB PROVIDER H&P - HISTORY OF PRESENT ILLNESS
23yo  at 39w0d, LANG 10/2/24, by 1st trimester sono presents to L&D for contractions x 4 hours that are aprox every 5 minutes. Pt reports good fetal movement. Denies leakage of fluid and vaginal bleeding. Reports an uncomplicated pregnancy. GBS neg. Rh positive.

## 2024-09-22 NOTE — OB PROVIDER H&P - ASSESSMENT
24y  @ [facility], GBS neg, in early labor.    -Admit to L & D  -IV hydration, labs  -Continuous EFM & TOCO monitoring  -Clear Liquid diet  -Pain Management PRN    Matilda Graham aware

## 2024-09-22 NOTE — OB PROVIDER H&P - NSPREVIOUSTERM_OBGYN_ALL_OB
Physician Progress Note      Jarod Dixon  CSN #:                  [id number]  :                       1972  ADMIT DATE:       2021 7:25 PM  [address] [place] DATE:        9/10/2021 3:52 PM  RESPONDING  PROVIDER #:        Duke Greenfield MD          QUERY TEXT:    Pt admitted with abdominal pain. Pt noted to have an elevated white count and   was tachycardic  with early sepsis in OP note. If possible, please document if you were   evaluating and /or treating any of the following: The medical record reflects the following:  Risk Factors: Post OP from , obesity, MS, Colon cancer  Clinical Indicators::wbc-14.9, 97.9, 109, 22; Op note:  \"This is a   80-year-old female 1 week out from laparoscopic  descending colon resection with diverting loop ileostomy who presented with   increased abdominal pain and some air and fluid around her anastomosis, which   made me think that she was having an anastomotic leak in the pelvis. She also   came in with a white count and was tachycardic with early sepsis. \". .. \" We   then dissected as best as we could around the anastomosis and the pelvis   exposing the anterior portion of the staple line without any signs of succus,   stool or abnormal postoperative changes. \"  Treatment:CT the abdomen, IVF, rocephin iv, Flagyl IV,   laparoscopy with   washout, lab monitoring and assessments. Thank you,  Susan Solorzano, RN, BSN, CCDS  [phone]  Options provided:  -- Sepsis was ruled out  -- Sepsis, POA and complication of post op colectomy  -- Sepsis, unrelated to lap colectomy  -- Other - I will add my own diagnosis  -- Disagree - Not applicable / Not valid  -- Disagree - Clinically unable to determine / Unknown  -- Refer to Clinical Documentation Reviewer    PROVIDER RESPONSE TEXT:    After further study, sepsis was ruled out for this patient. Query created by:  Chica Drake on 2021 7:32 AM      Electronically signed by:  Duke Greenfield MD 9/22/2021 7:04 AM No

## 2024-09-22 NOTE — OB PROVIDER H&P - NSLOWPPHRISK_OBGYN_A_OB
No previous uterine incision/Lyle Pregnancy/Less than or equal to 4 previous vaginal births/No known bleeding disorder/No history of postpartum hemorrhage

## 2024-09-22 NOTE — OB PROVIDER LABOR PROGRESS NOTE - NS_SUBJECTIVE/OBJECTIVE_OBGYN_ALL_OB_FT
PGY 1 Note    Patient seen at bedside for evaluation of 7min decel to the 90s follow epidural being re-done.  Patient was repositioned and given a bolus.  Reports ctx. Comfortable s/p epidural.    T(F): 98.06 (18:45)  HR: 94 (20:09)  BP: 123/62 (20:09)    morphine  - Injectable 4 milliGRAM(s) IV Push once    SVE: 5/80/-2 @1806 per Michelle Graham    Labs:                        11.1   11.91 )-----------( 257      ( 22 Sep 2024 12:05 )             34.4           ABO RH Interpretation: A POS (24 @ 12:05)    Urinalysis Basic - ( 22 Sep 2024 12:12 )    Color: Yellow / Appearance: Cloudy / S.016 / pH: x  Gluc: x / Ketone: Negative mg/dL  / Bili: Negative / Urobili: 0.2 mg/dL   Blood: x / Protein: Trace mg/dL / Nitrite: Negative   Leuk Esterase: Negative / RBC: 1 /HPF / WBC 2 /HPF   Sq Epi: x / Non Sq Epi: 6 /HPF / Bacteria: Few /HPF          Meds: chlorhexidine 2% Cloths 1 Application(s) Topical daily  fentanyl (2 MICROgram(s)/mL) + bupivacaine 0.0625%  in 0.9% Sodium Chloride PCEA 250 milliLiter(s) Epidural PCA Continuous  lactated ringers. 1000 milliLiter(s) IV Continuous <Continuous>  morphine  - Injectable 2 milliGRAM(s) IV Push once  oxytocin Infusion 167 milliUNIT(s)/Min IV Continuous <Continuous>  oxytocin Infusion. 2 milliUNIT(s)/Min IV Continuous <Continuous>

## 2024-09-22 NOTE — OB PROVIDER H&P - NSHPLABSRESULTS_GEN_ALL_CORE
3/19  Hep B NR  Rubella immune  Varicella immune  8/7  Hep B NR  Hep C NR  RPR NR  HIV NR  A POS, antibody neg   8/29  GBS neg    sonos:  38w2d: EFW 3637g,   35w2d: EFW 2657m nl anatomy, breech   10w: CRL 2.97cm

## 2024-09-22 NOTE — OB RN TRIAGE NOTE - NSICDXFAMILYHX_GEN_ALL_CORE_FT
FAMILY HISTORY:  FH: breast cancer  FHx: skin cancer    Mother  Still living? Unknown  Family history of stroke, Age at diagnosis: Age Unknown

## 2024-09-22 NOTE — OB RN PATIENT PROFILE - FALL HARM RISK - FALL HARM RISK
Photo Preface (Leave Blank If You Do Not Want): Photographs were obtained today: Detail Level: Zone No indicators present

## 2024-09-22 NOTE — OB PROVIDER LABOR PROGRESS NOTE - ASSESSMENT
A/P:  25yo  at 39w in labor at term.    -cont EFM/toco  -clear liquid diet, IVF  -pain management with epidural  -monitor vitals

## 2024-09-22 NOTE — OB PROVIDER H&P - NSHPPHYSICALEXAM_GEN_ALL_CORE
Physical Exam  Vital Signs Last 24 Hrs  T(F): 98 (22 Sep 2024 10:23), Max: 98 (22 Sep 2024 10:23)  HR: 78 (22 Sep 2024 10:35) (78 - 78)  BP: 125/71 (22 Sep 2024 10:35) (125/71 - 125/71)  RR: 16 (22 Sep 2024 10:23) (16 - 16)    Gen: NAD, AOx3  Abdomen: soft, gravid, nontender, no palpable contractions    EFM: 140bpm/moderate variability/+accels  [place]: q 5 minutes  SVE: 4/80/-3    BSS: vertex

## 2024-09-23 LAB
ALBUMIN SERPL ELPH-MCNC: 3.3 G/DL — LOW (ref 3.5–5.2)
ALP SERPL-CCNC: 111 U/L — SIGNIFICANT CHANGE UP (ref 30–115)
ALT FLD-CCNC: 15 U/L — SIGNIFICANT CHANGE UP (ref 0–41)
ANION GAP SERPL CALC-SCNC: 13 MMOL/L — SIGNIFICANT CHANGE UP (ref 7–14)
AST SERPL-CCNC: 27 U/L — SIGNIFICANT CHANGE UP (ref 0–41)
BASOPHILS # BLD AUTO: 0.02 K/UL — SIGNIFICANT CHANGE UP (ref 0–0.2)
BASOPHILS NFR BLD AUTO: 0.1 % — SIGNIFICANT CHANGE UP (ref 0–1)
BILIRUB SERPL-MCNC: 0.2 MG/DL — SIGNIFICANT CHANGE UP (ref 0.2–1.2)
BUN SERPL-MCNC: 6 MG/DL — LOW (ref 10–20)
CALCIUM SERPL-MCNC: 8.5 MG/DL — SIGNIFICANT CHANGE UP (ref 8.4–10.5)
CHLORIDE SERPL-SCNC: 105 MMOL/L — SIGNIFICANT CHANGE UP (ref 98–110)
CO2 SERPL-SCNC: 19 MMOL/L — SIGNIFICANT CHANGE UP (ref 17–32)
CREAT SERPL-MCNC: 0.7 MG/DL — SIGNIFICANT CHANGE UP (ref 0.7–1.5)
EGFR: 125 ML/MIN/1.73M2 — SIGNIFICANT CHANGE UP
EOSINOPHIL # BLD AUTO: 0.06 K/UL — SIGNIFICANT CHANGE UP (ref 0–0.7)
EOSINOPHIL NFR BLD AUTO: 0.4 % — SIGNIFICANT CHANGE UP (ref 0–8)
GLUCOSE SERPL-MCNC: 112 MG/DL — HIGH (ref 70–99)
HCT VFR BLD CALC: 28.6 % — LOW (ref 37–47)
HGB BLD-MCNC: 9.3 G/DL — LOW (ref 12–16)
IMM GRANULOCYTES NFR BLD AUTO: 0.8 % — HIGH (ref 0.1–0.3)
L&D DRUG SCREEN, URINE: SIGNIFICANT CHANGE UP
LDH SERPL L TO P-CCNC: 307 — HIGH (ref 50–242)
LYMPHOCYTES # BLD AUTO: 1.68 K/UL — SIGNIFICANT CHANGE UP (ref 1.2–3.4)
LYMPHOCYTES # BLD AUTO: 11.1 % — LOW (ref 20.5–51.1)
MCHC RBC-ENTMCNC: 23.5 PG — LOW (ref 27–31)
MCHC RBC-ENTMCNC: 32.5 G/DL — SIGNIFICANT CHANGE UP (ref 32–37)
MCV RBC AUTO: 72.2 FL — LOW (ref 81–99)
MONOCYTES # BLD AUTO: 1.04 K/UL — HIGH (ref 0.1–0.6)
MONOCYTES NFR BLD AUTO: 6.8 % — SIGNIFICANT CHANGE UP (ref 1.7–9.3)
NEUTROPHILS # BLD AUTO: 12.28 K/UL — HIGH (ref 1.4–6.5)
NEUTROPHILS NFR BLD AUTO: 80.8 % — HIGH (ref 42.2–75.2)
NRBC # BLD: 0 /100 WBCS — SIGNIFICANT CHANGE UP (ref 0–0)
PLATELET # BLD AUTO: 228 K/UL — SIGNIFICANT CHANGE UP (ref 130–400)
PMV BLD: 12 FL — HIGH (ref 7.4–10.4)
POTASSIUM SERPL-MCNC: 4 MMOL/L — SIGNIFICANT CHANGE UP (ref 3.5–5)
POTASSIUM SERPL-SCNC: 4 MMOL/L — SIGNIFICANT CHANGE UP (ref 3.5–5)
PROT SERPL-MCNC: 5.3 G/DL — LOW (ref 6–8)
RBC # BLD: 3.96 M/UL — LOW (ref 4.2–5.4)
RBC # FLD: 15.2 % — HIGH (ref 11.5–14.5)
SODIUM SERPL-SCNC: 137 MMOL/L — SIGNIFICANT CHANGE UP (ref 135–146)
URATE SERPL-MCNC: 6 MG/DL — SIGNIFICANT CHANGE UP (ref 2.5–7)
WBC # BLD: 15.2 K/UL — HIGH (ref 4.8–10.8)
WBC # FLD AUTO: 15.2 K/UL — HIGH (ref 4.8–10.8)

## 2024-09-23 PROCEDURE — 59510 CESAREAN DELIVERY: CPT

## 2024-09-23 RX ORDER — TETANUS TOXOID, REDUCED DIPHTHERIA TOXOID AND ACELLULAR PERTUSSIS VACCINE, ADSORBED 5; 2.5; 8; 8; 2.5 [IU]/.5ML; [IU]/.5ML; UG/.5ML; UG/.5ML; UG/.5ML
0.5 SUSPENSION INTRAMUSCULAR ONCE
Refills: 0 | Status: DISCONTINUED | OUTPATIENT
Start: 2024-09-23 | End: 2024-09-25

## 2024-09-23 RX ORDER — OXYCODONE HYDROCHLORIDE 30 MG/1
5 TABLET, FILM COATED, EXTENDED RELEASE ORAL ONCE
Refills: 0 | Status: DISCONTINUED | OUTPATIENT
Start: 2024-09-23 | End: 2024-09-25

## 2024-09-23 RX ORDER — OXYTOCIN/RINGER'S LACTATE 20/500ML
PLASTIC BAG, INJECTION (ML) INTRAVENOUS
Qty: 30 | Refills: 0 | Status: DISCONTINUED | OUTPATIENT
Start: 2024-09-23 | End: 2024-09-25

## 2024-09-23 RX ORDER — SODIUM CHLORIDE IRRIG SOLUTION 0.9 %
1000 SOLUTION, IRRIGATION IRRIGATION
Refills: 0 | Status: DISCONTINUED | OUTPATIENT
Start: 2024-09-23 | End: 2024-09-25

## 2024-09-23 RX ORDER — AZITHROMYCIN 250 MG/1
500 TABLET, FILM COATED ORAL ONCE
Refills: 0 | Status: DISCONTINUED | OUTPATIENT
Start: 2024-09-23 | End: 2024-09-23

## 2024-09-23 RX ORDER — CEFAZOLIN SODIUM 1 G
2000 VIAL (EA) INJECTION ONCE
Refills: 0 | Status: DISCONTINUED | OUTPATIENT
Start: 2024-09-23 | End: 2024-09-23

## 2024-09-23 RX ORDER — ACETAMINOPHEN 325 MG
975 TABLET ORAL
Refills: 0 | Status: DISCONTINUED | OUTPATIENT
Start: 2024-09-23 | End: 2024-09-25

## 2024-09-23 RX ORDER — MAGNESIUM HYDROXIDE 400 MG/5ML
30 SUSPENSION, ORAL (FINAL DOSE FORM) ORAL
Refills: 0 | Status: DISCONTINUED | OUTPATIENT
Start: 2024-09-23 | End: 2024-09-25

## 2024-09-23 RX ORDER — HYDROMORPHONE HYDROCHLORIDE 1 MG/ML
0.5 INJECTION, SOLUTION INTRAMUSCULAR; INTRAVENOUS; SUBCUTANEOUS
Refills: 0 | Status: DISCONTINUED | OUTPATIENT
Start: 2024-09-23 | End: 2024-09-25

## 2024-09-23 RX ORDER — OXYCODONE HYDROCHLORIDE 30 MG/1
5 TABLET, FILM COATED, EXTENDED RELEASE ORAL
Refills: 0 | Status: COMPLETED | OUTPATIENT
Start: 2024-09-23 | End: 2024-09-30

## 2024-09-23 RX ORDER — ENOXAPARIN SODIUM 150 MG/ML
40 INJECTION SUBCUTANEOUS EVERY 24 HOURS
Refills: 0 | Status: DISCONTINUED | OUTPATIENT
Start: 2024-09-23 | End: 2024-09-25

## 2024-09-23 RX ORDER — ONDANSETRON HCL/PF 4 MG/2 ML
4 VIAL (ML) INJECTION ONCE
Refills: 0 | Status: DISCONTINUED | OUTPATIENT
Start: 2024-09-23 | End: 2024-09-25

## 2024-09-23 RX ORDER — SODIUM CHLORIDE IRRIG SOLUTION 0.9 %
1000 SOLUTION, IRRIGATION IRRIGATION
Refills: 0 | Status: DISCONTINUED | OUTPATIENT
Start: 2024-09-23 | End: 2024-09-23

## 2024-09-23 RX ORDER — MORPHINE SULFATE 30 MG/1
2.5 TABLET, FILM COATED, EXTENDED RELEASE ORAL ONCE
Refills: 0 | Status: DISCONTINUED | OUTPATIENT
Start: 2024-09-23 | End: 2024-09-25

## 2024-09-23 RX ORDER — DIPHENHYDRAMINE HCL 12.5MG/5ML
25 LIQUID (ML) ORAL EVERY 6 HOURS
Refills: 0 | Status: DISCONTINUED | OUTPATIENT
Start: 2024-09-23 | End: 2024-09-25

## 2024-09-23 RX ADMIN — Medication 600 MILLIGRAM(S): at 06:50

## 2024-09-23 RX ADMIN — Medication 80 MILLIGRAM(S): at 10:04

## 2024-09-23 RX ADMIN — Medication 600 MILLIGRAM(S): at 14:08

## 2024-09-23 RX ADMIN — Medication 975 MILLIGRAM(S): at 21:28

## 2024-09-23 RX ADMIN — ENOXAPARIN SODIUM 40 MILLIGRAM(S): 150 INJECTION SUBCUTANEOUS at 18:36

## 2024-09-23 RX ADMIN — Medication 975 MILLIGRAM(S): at 10:33

## 2024-09-23 RX ADMIN — Medication 80 MILLIGRAM(S): at 18:36

## 2024-09-23 RX ADMIN — Medication 167 MILLIUNIT(S)/MIN: at 03:42

## 2024-09-23 RX ADMIN — Medication 150 MILLILITER(S): at 03:42

## 2024-09-23 RX ADMIN — Medication 975 MILLIGRAM(S): at 10:03

## 2024-09-23 RX ADMIN — Medication 975 MILLIGRAM(S): at 16:04

## 2024-09-23 RX ADMIN — Medication 600 MILLIGRAM(S): at 19:07

## 2024-09-23 RX ADMIN — Medication 975 MILLIGRAM(S): at 22:28

## 2024-09-23 RX ADMIN — Medication 600 MILLIGRAM(S): at 18:37

## 2024-09-23 RX ADMIN — Medication 600 MILLIGRAM(S): at 13:38

## 2024-09-23 NOTE — OB RN INTRAOPERATIVE NOTE - NSSELHIDDEN_OBGYN_ALL_OB_FT
[NS_DeliveryAttending1_OBGYN_ALL_OB_FT:[id number]],[NS_DeliveryAssist1_OBGYN_ALL_OB_FT:[id number]],[NS_DeliveryRN_OBGYN_ALL_OB_FT:[id number]],[NS_CirculateRN2_OBGYN_ALL_OB_FT:[id number]]

## 2024-09-23 NOTE — ANESTHESIA FOLLOW-UP NOTE - NSEVALATIONFT_GEN_ALL_CORE
Patient c/o back soreness from multiple epidural attempts, reassured that soreness will get better in a few days, continue taking Motrin/Tylenol.

## 2024-09-23 NOTE — ANESTHESIA FOLLOW-UP NOTE - NSEVALATION_GEN_ALL_CORE
No apparent complications or complaints regarding anesthesia care at this time All questions were answered

## 2024-09-23 NOTE — PROGRESS NOTE ADULT - ASSESSMENT
24y now P1 s/p primary LTCS for fetal bradycardia, PPD 0. Recovering appropriately    -Monitor vitals  -Pain management PRN  -Encourage ambulation  -Incentive spirometry  -Advance diet as tolerated, fulls for lunch  -DVT ppx: Lovenox, SCDs

## 2024-09-23 NOTE — OB RN DELIVERY SUMMARY - NSSELHIDDEN_OBGYN_ALL_OB_FT
[NS_DeliveryAttending1_OBGYN_ALL_OB_FT:[id number]],[NS_DeliveryAssist1_OBGYN_ALL_OB_FT:[id number]],[NS_DeliveryRN_OBGYN_ALL_OB_FT:[id number]],[NS_CirculateRN2_OBGYN_ALL_OB_FT:[id number]]

## 2024-09-23 NOTE — BRIEF OPERATIVE NOTE - OPERATION/FINDINGS
pfannisteil incision made, live male infant delivered in vertex presentation, loose nuchal cord, with caput noted

## 2024-09-24 ENCOUNTER — TRANSCRIPTION ENCOUNTER (OUTPATIENT)
Age: 24
End: 2024-09-24

## 2024-09-24 RX ORDER — OXYCODONE HYDROCHLORIDE 30 MG/1
5 TABLET, FILM COATED, EXTENDED RELEASE ORAL ONCE
Refills: 0 | Status: DISCONTINUED | OUTPATIENT
Start: 2024-09-24 | End: 2024-09-25

## 2024-09-24 RX ORDER — OXYCODONE HYDROCHLORIDE 30 MG/1
5 TABLET, FILM COATED, EXTENDED RELEASE ORAL
Refills: 0 | Status: DISCONTINUED | OUTPATIENT
Start: 2024-09-24 | End: 2024-09-25

## 2024-09-24 RX ORDER — OMEPRAZOLE 20 MG/1
0 CAPSULE, DELAYED RELEASE ORAL
Refills: 0 | DISCHARGE

## 2024-09-24 RX ADMIN — Medication 600 MILLIGRAM(S): at 12:09

## 2024-09-24 RX ADMIN — Medication 80 MILLIGRAM(S): at 21:54

## 2024-09-24 RX ADMIN — Medication 600 MILLIGRAM(S): at 07:50

## 2024-09-24 RX ADMIN — Medication 600 MILLIGRAM(S): at 23:55

## 2024-09-24 RX ADMIN — Medication 30 MILLILITER(S): at 14:01

## 2024-09-24 RX ADMIN — Medication 975 MILLIGRAM(S): at 22:52

## 2024-09-24 RX ADMIN — Medication 17 GRAM(S): at 17:45

## 2024-09-24 RX ADMIN — Medication 600 MILLIGRAM(S): at 01:12

## 2024-09-24 RX ADMIN — Medication 80 MILLIGRAM(S): at 02:07

## 2024-09-24 RX ADMIN — Medication 975 MILLIGRAM(S): at 16:05

## 2024-09-24 RX ADMIN — Medication 975 MILLIGRAM(S): at 15:05

## 2024-09-24 RX ADMIN — Medication 975 MILLIGRAM(S): at 21:52

## 2024-09-24 RX ADMIN — Medication 600 MILLIGRAM(S): at 18:44

## 2024-09-24 RX ADMIN — Medication 600 MILLIGRAM(S): at 00:12

## 2024-09-24 RX ADMIN — ENOXAPARIN SODIUM 40 MILLIGRAM(S): 150 INJECTION SUBCUTANEOUS at 17:44

## 2024-09-24 RX ADMIN — Medication 975 MILLIGRAM(S): at 09:04

## 2024-09-24 RX ADMIN — Medication 30 MILLILITER(S): at 02:07

## 2024-09-24 RX ADMIN — OXYCODONE HYDROCHLORIDE 5 MILLIGRAM(S): 30 TABLET, FILM COATED, EXTENDED RELEASE ORAL at 03:22

## 2024-09-24 RX ADMIN — Medication 600 MILLIGRAM(S): at 06:12

## 2024-09-24 RX ADMIN — Medication 600 MILLIGRAM(S): at 07:12

## 2024-09-24 RX ADMIN — Medication 80 MILLIGRAM(S): at 12:10

## 2024-09-24 RX ADMIN — Medication 975 MILLIGRAM(S): at 10:04

## 2024-09-24 RX ADMIN — Medication 600 MILLIGRAM(S): at 17:44

## 2024-09-24 RX ADMIN — OXYCODONE HYDROCHLORIDE 5 MILLIGRAM(S): 30 TABLET, FILM COATED, EXTENDED RELEASE ORAL at 02:22

## 2024-09-24 RX ADMIN — Medication 600 MILLIGRAM(S): at 13:09

## 2024-09-24 NOTE — DISCHARGE NOTE OB - PATIENT PORTAL LINK FT
You can access the FollowMyHealth Patient Portal offered by [facility] by registering at the following website: [email or web address]. By joining [facility]’s FollowMyHealth portal, you will also be able to view your health information using other applications (apps) compatible with our system.

## 2024-09-24 NOTE — DISCHARGE NOTE OB - CARE PROVIDERS DIRECT ADDRESSES
[email or web address]

## 2024-09-24 NOTE — DISCHARGE NOTE OB - MEDICATION SUMMARY - MEDICATIONS TO TAKE
I will START or STAY ON the medications listed below when I get home from the hospital:    ibuprofen 600 mg oral tablet  -- 1 tab(s) by mouth every 6 hours  -- Indication: For pain    simethicone 80 mg oral tablet, chewable  -- 1 tab(s) by mouth every 4 hours As needed Gas  -- Indication: For gas pain   I will START or STAY ON the medications listed below when I get home from the hospital:    ibuprofen 600 mg oral tablet  -- 1 tab(s) by mouth every 6 hours  -- Indication: For pain    acetaminophen 325 mg oral tablet  -- 3 tab(s) by mouth every 6 hours  -- Indication: For pain    simethicone 80 mg oral tablet, chewable  -- 1 tab(s) by mouth every 4 hours As needed Gas  -- Indication: For gas pain

## 2024-09-24 NOTE — DISCHARGE NOTE OB - CARE PLAN
Principal Discharge DX:	 delivery delivered  Assessment and plan of treatment:	No heavy lifting.   Nothing inside the vagina for 6 weeks: no tampons, douching, sexual intercourse, tub baths or pools.   If you have a fever over 100.4F, severe abdominal pain or heavy vaginal bleeding please call your doctor or go to the emergency room.  Please follow up with your OBGYN in 1 week for incision check and 6 weeks for a postpartum visit.   1 Principal Discharge DX:	 delivery delivered  Assessment and plan of treatment:	No heavy lifting.   Nothing inside the vagina for 6 weeks: no tampons, douching, sexual intercourse, tub baths or pools.   If you have a fever over 100.4F, severe abdominal pain or heavy vaginal bleeding please call your doctor or go to the emergency room.  Please follow up with your OBGYN in 2 weeks for incision check and 6 weeks for a postpartum visit.

## 2024-09-24 NOTE — DISCHARGE NOTE OB - REASON FOR ADMISSION
24 yaer old ayade G 2 p 0 1b 1 admitted at 39 wks with labor pains 4-5 cm 24 year old female  admitted at 39 wks with labor pains 4-5 cm

## 2024-09-24 NOTE — DISCHARGE NOTE OB - NS AS DC FU INST LIST INST
Left voicemail and requested a call back to reschedule 10/01/2024 appt with Dr. Rendon, she is not in office that day.   no

## 2024-09-24 NOTE — DISCHARGE NOTE OB - CARE PROVIDER_API CALL
Royal Nolasco  Obstetrics and Gynecology  [address] [place] [id number]  Phone: [phone]  Fax: [phone]  Follow Up Time: 1 week

## 2024-09-24 NOTE — DISCHARGE NOTE OB - MEDICATION SUMMARY - MEDICATIONS TO STOP TAKING
Ambulatory I will STOP taking the medications listed below when I get home from the hospital:    fluconazole 50 mg oral tablet  -- 3 tab(s) by mouth once   -- Do not take this drug if you are pregnant.  Finish all this medication unless otherwise directed by prescriber.    levoFLOXacin 750 mg oral tablet  -- 1 tab(s) by mouth once a day   -- Avoid prolonged or excessive exposure to direct and/or artificial sunlight while taking this medication.  Do not take dairy products, antacids, or iron preparations within one hour of this medication.  Finish all this medication unless otherwise directed by prescriber.  May cause drowsiness or dizziness.  Medication should be taken with plenty of water.

## 2024-09-24 NOTE — PROGRESS NOTE ADULT - ASSESSMENT
25 yo P1, s/p primary stat  fetal bradycardia, POD1, doing well    - routine postpartum care  - pain management PRN  - monitor vitals and bleeding  - regular diet, PO hydration  - ambulation  - lovenox  - likely discharge tomorrow

## 2024-09-24 NOTE — DISCHARGE NOTE OB - AVOID TUB BATHING UNTIL YOUR POSTPARTUM VISIT
Assessment and Plan:     Problem List Items Addressed This Visit        Other    Encounter for Medicare annual wellness exam - Primary     Discussed preventative health, cancer screening, immunizations, and safety issues  Patient is up-to-date with everything except the new shingles shot  I recommend getting the Shingrix shot to help prevent Shingles  You can get it a pharmacy, and they can administer it there  It is a two shot series with the second shot needed between 2-6 months after the first shot  I would not recommend getting the shot before an important or fun event in case you were to have a reaction to the shot like a sore arm or flu-like symptoms  I make the same recommendation about any shot, as people can have a reaction to any shot  Preventive health issues were discussed with patient, and age appropriate screening tests were ordered as noted in patient's After Visit Summary  Personalized health advice and appropriate referrals for health education or preventive services given if needed, as noted in patient's After Visit Summary       History of Present Illness:     Patient presents for Medicare Annual Wellness visit    Patient Care Team:  Shashi Kang MD as PCP - Noella Plaza, MD Petra Llanos, DO Tyron [place], DO Malka Apley, MD Trent Fate, MD Emogene Bras, MD     Problem List:     Patient Active Problem List   Diagnosis    Aneurysm of thoracic aorta [facility])    Essential hypertension    Atrial fibrillation, persistent [facility] [address] [facility]    Iliac artery aneurysm, bilateral ([facility] [address] [facility]    Acquired hypothyroidism    Neuropathy    Pre-operative cardiovascular examination    Iliac artery aneurysm, right ([facility] [address] )    Encounter for Medicare annual wellness exam    Hematoma    URI (upper respiratory infection)    Cholecystitis    Acute blood loss anemia    Hypophosphatemia    Other headache syndrome    Rash    Preop examination      Past Medical and Surgical History:     Past Medical History:   Diagnosis Date    Aneurysm of thoracic aorta ([facility] [address] )     Arrhythmia     Detached retina, right     Disease of thyroid gland     Gastric wall thickening     Headache     Hypertension     Iliac artery aneurysm, bilateral (HCC)     Irregular heart beat     afib cardioversion 1/30/17, x2     Neuropathy     bilateral feet    Paroxysmal A-fib ([facility] [address] )     Prostatic hypertrophy     Renal artery dissection [facility])      Past Surgical History:   Procedure Laterality Date    ABDOMINAL AORTIC ANEURYSM REPAIR, ENDOVASCULAR Right 4/13/2018    Procedure: REPAIR ANEURYSM ILIAC endovascular  with coil embolization right hypogastric artery ;  Surgeon: Kristy Seip, MD;  [facility];  Service: Vascular    CARDIOVERSION      CATARACT EXTRACTION      onset 11/17/2011    CHOLECYSTECTOMY LAPAROSCOPIC N/A 3/8/2019    Procedure: [id number] CHOLECYSTECTOMY;  Surgeon: Lesley Black DO;  [facility];  Service: General    COLONOSCOPY      SD [address] N/A 11/10/2017    Procedure: RADIAL ENDOSCOPIC U/S;  Surgeon: Jacqueline Acosta MD;  Location: [facility];   Service: Gastroenterology    RETINAL DETACHMENT SURGERY Right     onset 1992, retinal detachment complete air fill confirmed      Family History:     Family History   Problem Relation Age of Onset    Aortic aneurysm Father         abdominal    Aortic aneurysm Brother     Stroke Mother         CVA    Cancer Maternal Grandmother         malignant neoplasm    Cancer Maternal Grandfather         malignant neoplasm    Cancer Paternal Grandmother         malignant neoplasm    Cancer Paternal Grandfather         malignant neoplasm    Cancer Family         malignant neoplasm    Cancer Family         malignant neoplasm      Social History:     Social History     Socioeconomic History    Marital status: /Civil Union     Spouse name: None    Number of children: None    Years of education: None    Highest education level: None   Occupational History    None   Social Needs    Financial resource strain: None    Food insecurity:     Worry: None     Inability: None    Transportation needs:     Medical: None     Non-medical: None   Tobacco Use    Smoking status: Never Smoker    Smokeless tobacco: Never Used   Substance and Sexual Activity    Alcohol use: Yes     Comment: socially     Drug use: No    Sexual activity: None   Lifestyle    Physical activity:     Days per week: None     Minutes per session: None    Stress: None   Relationships    Social connections:     Talks on phone: None     Gets together: None     Attends Synagogue service: None     Active member of club or organization: None     Attends meetings of clubs or organizations: None     Relationship status: None    Intimate partner violence:     Fear of current or ex partner: None     Emotionally abused: None     Physically abused: None     Forced sexual activity: None   Other Topics Concern    None   Social History Narrative    None       Medications and Allergies:     Current Outpatient Medications   Medication Sig Dispense Refill    acetaminophen (TYLENOL) 325 mg tablet Take 2 tablets (650 mg total) by mouth every 6 (six) hours as needed for mild pain or fever 30 tablet 0    apixaban (ELIQUIS) 5 mg Take 1 tablet (5 mg total) by mouth 2 (two) times a day 180 tablet 3    aspirin (ECOTRIN LOW STRENGTH) 81 mg EC tablet Take 81 mg by mouth daily      atorvastatin (LIPITOR) 20 mg tablet Take 1 tablet (20 mg total) by mouth daily 30 tablet 11    BESIVANCE 0 6 % SUSP INSTILL 1 DROP INTO LEFT EYE TWICE A DAY  0    Co-Enzyme Q-10 100 MG CAPS Take 100 mg by mouth daily      cyclopentolate (CYCLOGYL) 1 % ophthalmic solution INSTILL 1 DROP INTO LEFT EYE 3 TIMES A DAY  0    DUREZOL 0 05 % EMUL INSTILL 1 DROP INTO LEFT EYE TWICE A DAY  0    felodipine (PLENDIL) 5 mg 24 hr tablet Take 1 tablet (5 mg total) by mouth daily 90 tablet 2    gabapentin (NEURONTIN) 100 mg capsule Take 1 capsule (100 mg total) by mouth 2 (two) times a day Up to 300mg additional if headaches flaring 180 capsule 3    ILEVRO 0 3 % SUSP INSTILL 1 DROP IN LEFT EYE NIGHTLY  0    levothyroxine 75 mcg tablet Take 1 tablet (75 mcg total) by mouth daily 90 tablet 3    Misc Natural Products (LUTEIN 20 PO) Take 20 mg by mouth daily   multivitamin (THERAGRAN) TABS Take 1 tablet by mouth daily   nystatin (MYCOSTATIN) powder Apply topically 2 (two) times a day 60 g 1     No current facility-administered medications for this visit  Allergies   Allergen Reactions    Wound Dressing Adhesive       Immunizations:     Immunization History   Administered Date(s) Administered    H1N1, All Formulations 01/09/2010    INFLUENZA 11/29/2016, 10/23/2017    Influenza Split High Dose Preservative Free IM 11/16/2010, 09/19/2012, 09/24/2014, 09/16/2015, 11/29/2016, 10/23/2017    Influenza, high dose seasonal 0 5 mL 10/15/2019    Pneumococcal Conjugate 13-Valent 11/29/2016    Pneumococcal Polysaccharide PPV23 08/01/2018    Tdap 11/29/2016      Health Maintenance:         Topic Date Due    CRC Screening: Colonoscopy  07/13/2021    Hepatitis C Screening  Completed         Topic Date Due    HEPATITIS B VACCINES (1 of 3 - Risk 3-dose series) 01/19/1967      Medicare Health Risk Assessment:     /90 (BP Location: Left arm, Patient Position: Sitting, Cuff Size: Large)   Pulse 63   Temp 97 5 °F (36 4 °C) (Oral)   Resp 18   Ht 6' 1" (1 854 m)   Wt 108 kg (237 lb)   SpO2 96%   BMI 31 27 kg/m²      Marissa Freire is here for his Subsequent Wellness visit  Health Risk Assessment:   Patient rates overall health as very good  Patient feels that their physical health rating is same  Eyesight was rated as slightly worse  Hearing was rated as same  Patient feels that their emotional and mental health rating is same   Pain experienced in the last 7 days has been some  Patient's pain rating has been 3/10  Patient states that he has experienced no weight loss or gain in last 6 months  Depression Screening:   PHQ-2 Score: 0      Fall Risk Screening: In the past year, patient has experienced: no history of falling in past year      Home Safety:  Patient does not have trouble with stairs inside or outside of their home  Patient has working smoke alarms Home safety hazards include: none  Nutrition:   Current diet is Regular and Limited junk food  Medications:   Patient is currently taking over-the-counter supplements  OTC medications include: see medication list  Patient is able to manage medications  Activities of Daily Living (ADLs)/Instrumental Activities of Daily Living (IADLs):   Walk and transfer into and out of bed and chair?: Yes  Dress and groom yourself?: Yes    Bathe or shower yourself?: Yes    Feed yourself? Yes  Do your laundry/housekeeping?: Yes  Manage your money, pay your bills and track your expenses?: Yes  Make your own meals?: Yes    Do your own shopping?: Yes    Previous Hospitalizations:   Any hospitalizations or ED visits within the last 12 months?: No      Advance Care Planning:   Living will: No    Durable POA for healthcare:  Yes    Advanced directive: No    Advanced directive counseling given: Yes      Cognitive Screening:   Provider or family/friend/caregiver concerned regarding cognition?: No    PREVENTIVE SCREENINGS      Cardiovascular Screening:    General: Screening Current      Diabetes Screening:     General: Screening Current      Colorectal Cancer Screening:     General: Screening Current      Prostate Cancer Screening:    General: Screening Current      Osteoporosis Screening:    General: Screening Not Indicated      Abdominal Aortic Aneurysm (AAA) Screening:    Risk factors include: age between 73-69 yo        General: Screening Not Indicated and History AAA      Lung Cancer Screening:     General: Screening Not Indicated      Hepatitis C Screening:    General: Screening Current    Other Counseling Topics:   Car/seat belt/driving safety, skin self-exam and sunscreen         Jaimee Jasmine MD Statement Selected

## 2024-09-24 NOTE — DISCHARGE NOTE OB - PLAN OF CARE
No heavy lifting.   Nothing inside the vagina for 6 weeks: no tampons, douching, sexual intercourse, tub baths or pools.   If you have a fever over 100.4F, severe abdominal pain or heavy vaginal bleeding please call your doctor or go to the emergency room.  Please follow up with your OBGYN in 1 week for incision check and 6 weeks for a postpartum visit. No heavy lifting.   Nothing inside the vagina for 6 weeks: no tampons, douching, sexual intercourse, tub baths or pools.   If you have a fever over 100.4F, severe abdominal pain or heavy vaginal bleeding please call your doctor or go to the emergency room.  Please follow up with your OBGYN in 2 weeks for incision check and 6 weeks for a postpartum visit.

## 2024-09-24 NOTE — DISCHARGE NOTE OB - HOSPITAL COURSE
23yo now P1 s/p uncomplicated LTCS for fetal bradycardia recovering well. Meeting all PP milestones. Cleared for discharge

## 2024-09-24 NOTE — PROGRESS NOTE ADULT - ASSESSMENT
A/P: 23yo now P1 s/p primary LTCS for fetal bradycardia  -ambulation encouraged  -PO hydration encouraged  -regular diet  - milk of magnesium and senna ordered for constipation   -lovenox ordered for DVT prophylaxis  -Incentive Spirometry encouraged  -pain management per routine  -instructed to follow up in 1 week for incision check, and 6 weeks for postpartum check

## 2024-09-25 VITALS
DIASTOLIC BLOOD PRESSURE: 78 MMHG | HEART RATE: 80 BPM | RESPIRATION RATE: 18 BRPM | SYSTOLIC BLOOD PRESSURE: 117 MMHG | OXYGEN SATURATION: 97 % | TEMPERATURE: 98 F

## 2024-09-25 RX ORDER — ACETAMINOPHEN 325 MG
3 TABLET ORAL
Qty: 84 | Refills: 0
Start: 2024-09-25 | End: 2024-10-01

## 2024-09-25 RX ADMIN — Medication 600 MILLIGRAM(S): at 00:55

## 2024-09-25 RX ADMIN — Medication 600 MILLIGRAM(S): at 13:34

## 2024-09-25 RX ADMIN — Medication 975 MILLIGRAM(S): at 03:39

## 2024-09-25 RX ADMIN — Medication 80 MILLIGRAM(S): at 03:40

## 2024-09-25 RX ADMIN — Medication 600 MILLIGRAM(S): at 07:01

## 2024-09-25 RX ADMIN — Medication 600 MILLIGRAM(S): at 13:04

## 2024-09-25 RX ADMIN — Medication 975 MILLIGRAM(S): at 04:39

## 2024-09-25 RX ADMIN — Medication 80 MILLIGRAM(S): at 13:03

## 2024-09-25 NOTE — PROGRESS NOTE ADULT - ASSESSMENT
A/P: 25yo now P1 s/p primary LTCS for fetal bradycardia    -Monitor vitals  -Pain management PRN  -Encourage ambulation  -Incentive spirometry  -PO hydration  -DVT ppx: Lovenox, SCDs  -Anticipate d/c home today with instructions to f/u with OBGYN in 1 week for incision check and in 6 weeks for postpartum visit

## 2024-09-25 NOTE — PROGRESS NOTE ADULT - SUBJECTIVE AND OBJECTIVE BOX
ROSIE KAPADIA  24y  Female    PGY1 Note:  Patient seen and examined bedside. No overnight events. Denies heavy vaginal bleeding. Pain moderately controlled. Endorses 8/10 incisional pain worsened with constipation. Ambulating without difficulty. Tolerating diet, voiding, passing flatus, no BM. Breastfeeding.     Physical Exam  Vital Signs Last 24 Hrs  T(C): 36.9 (24 Sep 2024 03:41), Max: 37 (23 Sep 2024 08:18)  T(F): 98.4 (24 Sep 2024 03:41), Max: 98.6 (23 Sep 2024 08:18)  HR: 98 (24 Sep 2024 03:41) (85 - 98)  BP: 123/79 (24 Sep 2024 03:41) (116/74 - 130/80)  RR: 18 (24 Sep 2024 03:41) (18 - 18)  SpO2: 98% (24 Sep 2024 03:41) (97% - 100%)    Parameters below as of 24 Sep 2024 03:41  Patient On (Oxygen Delivery Method): room air        Gen: NAD, sitting comfortably  Ext: No calf tenderness, no swelling.   Abd: Soft, appropriately tender, BS+, fundus firm, and below umbilicus.   Lochia: Minimal rubra  Wound: Dressing changed. Pfannenstiel skin incision clean, dry intact. Steris in place. No surrounding edema or erythema.        PAST MEDICAL & SURGICAL HISTORY:  IBS (irritable bowel syndrome)      History of PCOS      S/P ear surgery          Diet: Regular    Labs:                        9.3    15.20 )-----------( 228      ( 23 Sep 2024 18:28 )             28.6                         11.1   11.91 )-----------( 257      ( 22 Sep 2024 12:05 )             34.4          acetaminophen     Tablet .. 975 milliGRAM(s) Oral <User Schedule>  diphenhydrAMINE 25 milliGRAM(s) Oral every 6 hours PRN  diphtheria/tetanus/pertussis (acellular) Vaccine (Adacel) 0.5 milliLiter(s) IntraMuscular once  enoxaparin Injectable 40 milliGRAM(s) SubCutaneous every 24 hours  HYDROmorphone  Injectable 0.5 milliGRAM(s) IV Push every 10 minutes PRN  ibuprofen  Tablet. 600 milliGRAM(s) Oral every 6 hours  lactated ringers. 1000 milliLiter(s) IV Continuous <Continuous>  lanolin Ointment 1 Application(s) Topical every 6 hours PRN  magnesium hydroxide Suspension 30 milliLiter(s) Oral two times a day PRN  morphine PF Epidural 2.5 milliGRAM(s) Epidural once  ondansetron Injectable 4 milliGRAM(s) IV Push once PRN  oxyCODONE    IR 5 milliGRAM(s) Oral every 3 hours PRN  oxyCODONE    IR 5 milliGRAM(s) Oral once PRN  oxyCODONE    IR 5 milliGRAM(s) Oral once PRN  oxytocin Infusion  milliUNIT(s)/Min IV Continuous <Continuous>  simethicone 80 milliGRAM(s) Chew every 4 hours PRN      
OB Attending Post Partum Note    Subjective: Patient seen and examined at bedside. Doing well, pain controlled. Minimal vaginal bleeding. Denies fever, chills, CP, SOB, N/V, LE pain. She is ambulating, voiding, passing flatus, tolerating regular diet, breast/bottle feeding.     Physical exam:    Vital Signs Last 24 Hrs  T(F): 98.3 (24 Sep 2024 16:29), Max: 98.6 (24 Sep 2024 07:39)  HR: 95 (24 Sep 2024 16:29) (86 - 100)  BP: 114/75 (24 Sep 2024 16:29) (114/75 - 123/79)  RR: 18 (24 Sep 2024 16:29) (18 - 18)  SpO2: 97% (24 Sep 2024 16:29) (97% - 99%)      Gen: NAD  CVS: s1s2, rrr  Lungs: ctab, no rhonchi or rales  Abdomen: Soft, nontender, no distension , firm uterine fundus below umbilicus.  Incision: well healing pfannenstiel skin incision, steris in place, c/d/i  Pelvic: Normal lochia noted  Ext: No calf tenderness    Diet: regular  meds:   acetaminophen     Tablet ..   975 milliGRAM(s) Oral (09-24-24 @ 15:05)   975 milliGRAM(s) Oral (09-24-24 @ 09:04)   975 milliGRAM(s) Oral (09-23-24 @ 21:28)    enoxaparin Injectable   40 milliGRAM(s) SubCutaneous (09-23-24 @ 18:36)    ibuprofen  Tablet.   600 milliGRAM(s) Oral (09-24-24 @ 12:09)   600 milliGRAM(s) Oral (09-24-24 @ 06:12)   600 milliGRAM(s) Oral (09-24-24 @ 00:12)   600 milliGRAM(s) Oral (09-23-24 @ 18:37)    magnesium hydroxide Suspension   30 milliLiter(s) Oral (09-24-24 @ 14:01)   30 milliLiter(s) Oral (09-24-24 @ 02:07)    oxyCODONE    IR   5 milliGRAM(s) Oral (09-24-24 @ 02:22)    simethicone   80 milliGRAM(s) Chew (09-24-24 @ 12:10)   80 milliGRAM(s) Chew (09-24-24 @ 02:07)   80 milliGRAM(s) Chew (09-23-24 @ 18:36)        LABS:                        9.3    15.20 )-----------( 228      ( 23 Sep 2024 18:28 )             28.6       09-23-24 @ 18:28      137  |  105  |  6[L]  ----------------------------<  112[H]  4.0   |  19  |  0.7        Ca    8.5      23 Sep 2024 18:28    TPro  5.3[L]  /  Alb  3.3[L]  /  TBili  0.2  /  DBili  x   /  AST  27  /  ALT  15  /  AlkPhos  111  09-23-24 @ 18:28        
PGY1 NOTE  Chief Complaint: s/p  section    HPI: No overnight events, no acute complaints. Pt found sitting up in bed, explains she is doing well. Pt endorses pain is well controlled on current regimen. Pt has been ambulating, voiding, passing gas, and tolerating regular diet without difficulty. Pt is breastfeeding without issue.   Denies HA, lightheadedness, palpitations, N/V, fevers, chills, CP, SOB, LE edema, heavy vaginal bleeding.     PAST MEDICAL & SURGICAL HISTORY:  IBS (irritable bowel syndrome)  History of PCOS  S/P ear surgery    Physical Exam  Vital Signs Last 24 Hrs  T(F): 97.9 (25 Sep 2024 06:00), Max: 98.3 (24 Sep 2024 16:29)  HR: 86 (25 Sep 2024 06:00) (86 - 95)  BP: 122/80 (25 Sep 2024 06:00) (114/75 - 128/81)  RR: 18 (25 Sep 2024 06:00) (18 - 18)    Physical exam:  General - AAOx3, NAD  Heart - S1S2, RRR  Lungs - CTA BL. Breathing unlabored. Speaking in clear complete sentences.   Abdomen:  - Soft, nontender, mildly distended, BS+  - Fundus firm, nontender, below the umbilicus  Incision - Clean, dry, intact steri-strips in place over pfannenstiel skin incision.  Pelvis/Vagina - Normal rubra lochia  Extremities - No calf tenderness, no swelling bilaterally.     Labs:                        9.3    15.20 )-----------( 228      ( 23 Sep 2024 18:28 )             28.6                         11.1   11.91 )-----------( 257      ( 22 Sep 2024 12:05 )             34.4     Antibody Screen: NEG (24 @ 12:05)      acetaminophen     Tablet .. 975 milliGRAM(s) Oral <User Schedule>, Routine  diphenhydrAMINE 25 milliGRAM(s) Oral every 6 hours, Routine PRN Pruritus  diphtheria/tetanus/pertussis (acellular) Vaccine (Adacel) 0.5 milliLiter(s) IntraMuscular once, Now  enoxaparin Injectable 40 milliGRAM(s) SubCutaneous every 24 hours, 17:00  HYDROmorphone  Injectable 0.5 milliGRAM(s) IV Push every 10 minutes, Routine PRN Moderate Pain (4 - 6)  ibuprofen  Tablet. 600 milliGRAM(s) Oral every 6 hours, Routine  lactated ringers. 1000 milliLiter(s) IV Continuous <Continuous>, Routine  lanolin Ointment 1 Application(s) Topical every 6 hours, Routine PRN Sore Nipples  magnesium hydroxide Suspension 30 milliLiter(s) Oral two times a day, Routine PRN Constipation  morphine PF Epidural 2.5 milliGRAM(s) Epidural once, Routine  ondansetron Injectable 4 milliGRAM(s) IV Push once, Routine PRN Nausea and/or Vomiting  oxyCODONE    IR 5 milliGRAM(s) Oral once, Routine PRN Moderate to Severe Pain (4-10)  oxyCODONE    IR 5 milliGRAM(s) Oral once, Routine PRN Moderate to Severe Pain (4-10)  oxyCODONE    IR 5 milliGRAM(s) Oral every 3 hours, Routine PRN Moderate to Severe Pain (4-10)  oxytocin Infusion  milliUNIT(s)/Min IV Continuous <Continuous>, Routine  polyethylene glycol 3350 17 Gram(s) Oral daily, Routine  simethicone 80 milliGRAM(s) Chew every 4 hours, Routine PRN Gas          
status post primary c/section for arrest of dilatation and descent  Afebrile and vs stable  Doing well. No complaints.  Ambulatory; voiding without complaints.passing flatus and has had a bowel movement  Tolerating diet. pain well controlled with tylenol or ibuprophen; only took codeine one time  Nursing and pumping and no breast complaints  PE abdomen;soft,not distended. uteirne fundus firm and well below umbilicus      low transverse incision healing well with subcuticular suture and steri strips in place. no induration      lochia very light      no calf tenderness       slight pedal edema    labs post op H/H 9.3/28.6 ; pre op 11.1/34.4    imp; status post primary  section doing well    plan;discharge home; all instructions given;           return to office in 2 wks for post operative appt or rto prn
24 year old female G 2 P 0010 admitted earlier today with complaints of contractions, 4-5 cm.  AROM done early at approximately 1pm. Due to somewhat non regular contractions, pitocin augmentation advised and innitally declined by patient due to pain ( multiple attempts at epidural unsucessful at that time and pt did not want pitocin until epidural given). Epidural finally achieved around 730pm and paitent experiencing good pain relief at present compaired to  prior to epidural.  Pitocin aubmentation started to do irreular contarctions for 4-6 minutes  with occasional coupling at times.. currently pitcocin at 2 mu /min and pt refusing to continue pitocin because she is afraid of the pain and would like to rest.  adivised the patient that the epidural seems to be working well and that the pitocin , with a working , effective epidural , should not cause her more pain , however when and if the time comes pushing,and if the vertex gets lower, some pain /pressure may be expected. Also explained that with ruptured membranes and time, there is always a rish of developing uteirne infection directely related to lenght of time of ruptured membraes . Contractions remain fairly irregular at q 5-7 minutes . fetal heart rate with good variabilty with occasional variable deceleration of short duration.  Leaving pitocin at 2 mu /min at this time with patient's consent and will not incraese pitocin at this time=as per patient desire. will conitnue to observe and re-examine in one hour.
PGY 2 Note    Patient seen at bedside for labor progression. No complaints at the moment.    T(F): 98.6 ( @ 11:47), Max: 98.6 ( @ 11:47)  HR: 85 ( @ 13:16)  BP: 127/66 ( @ 13:16) (105/54 - 137/70)  RR: 14 ( @ 11:47)  EFM: 150bpm/moderate variability/+accels  TOCO: q 4 minutes  SVE: 4/80/-3, AROM clear     Labs:                        11.1   11.91 )-----------( 257      ( 22 Sep 2024 12:05 )             34.4       Antibody Screen: NEG (24 @ 12:05)    Urinalysis Basic - ( 22 Sep 2024 12:12 )    Color: Yellow / Appearance: Cloudy / S.016 / pH: x  Gluc: x / Ketone: Negative mg/dL  / Bili: Negative / Urobili: 0.2 mg/dL   Blood: x / Protein: Trace mg/dL / Nitrite: Negative   Leuk Esterase: Negative / RBC: 1 /HPF / WBC 2 /HPF   Sq Epi: x / Non Sq Epi: 6 /HPF / Bacteria: Few /HPF        
PGY1 NOTE  Chief Complaint: s/p  section    HPI: Pt assessed at bedside after  delivery. Pt found sitting up in bed, breastfeeding. Explains pain is poorly controlled but has only had 1 dose of Tylenol and no Motrin so far. Does not want to take oxy. Pt doing well otherwise. Pt has not yet been ambulating, lugo in place draining clear yellow urine, not yet passing gas, and advancing diet as tolerated. Pt is attempting to breastfeed.   Denies HA, lightheadedness, palpitations, N/V, fevers, chills, CP, SOB, LE edema, heavy vaginal bleeding.     PAST MEDICAL & SURGICAL HISTORY:  IBS (irritable bowel syndrome)  History of PCOS  S/P ear surgery    Physical Exam  Vital Signs Last 24 Hrs  T(F): 98.6 (23 Sep 2024 08:18), Max: 99.4 (23 Sep 2024 06:05)  HR: 89 (23 Sep 2024 08:18) (71 - 114)  BP: 126/75 (23 Sep 2024 08:18) (105/54 - 159/114)  RR: 18 (23 Sep 2024 08:18) (14 - 20)    Physical exam:  General - AAOx3, NAD  Heart - S1S2, RRR  Lungs - CTA BL. Breathing unlabored. Speaking in clear complete sentences.   Abdomen:  - Soft, nontender, mildly distended, BS+  - Fundus firm, nontender, below the umbilicus  Incision - Dressing in place, clean, dry, intact. No surrounding erythema, swelling, drainage.  Pelvis/Vagina - Normal rubra lochia  Extremities - No calf tenderness, no swelling bilaterally.     Labs:                        11.1   11.91 )-----------( 257      ( 22 Sep 2024 12:05 )             34.4     Antibody Screen: NEG (24 @ 12:05)    Prenatal Syphilis Test: Nonreact (24 @ 12:05)      Urine Output: (1833-1500) 430cc     acetaminophen     Tablet .. 975 milliGRAM(s) Oral <User Schedule>, Routine  diphenhydrAMINE 25 milliGRAM(s) Oral every 6 hours, Routine PRN Pruritus  diphtheria/tetanus/pertussis (acellular) Vaccine (Adacel) 0.5 milliLiter(s) IntraMuscular once, Now  enoxaparin Injectable 40 milliGRAM(s) SubCutaneous every 24 hours, 17:00  HYDROmorphone  Injectable 0.5 milliGRAM(s) IV Push every 10 minutes, Routine PRN Moderate Pain (4 - 6)  ibuprofen  Tablet. 600 milliGRAM(s) Oral every 6 hours, Routine  lactated ringers. 1000 milliLiter(s) IV Continuous <Continuous>, Routine  lactated ringers. 1000 milliLiter(s) IV Continuous <Continuous>, Routine  lanolin Ointment 1 Application(s) Topical every 6 hours, Routine PRN Sore Nipples  magnesium hydroxide Suspension 30 milliLiter(s) Oral two times a day, Routine PRN Constipation  morphine PF Epidural 2.5 milliGRAM(s) Epidural once, Routine  ondansetron Injectable 4 milliGRAM(s) IV Push once, Routine PRN Nausea and/or Vomiting  oxyCODONE    IR 5 milliGRAM(s) Oral every 3 hours, Routine PRN Moderate to Severe Pain (4-10)  oxyCODONE    IR 5 milliGRAM(s) Oral once, Routine PRN Moderate to Severe Pain (4-10)  oxytocin Infusion  milliUNIT(s)/Min IV Continuous <Continuous>, Routine  simethicone 80 milliGRAM(s) Chew every 4 hours, Routine PRN Gas

## 2024-09-26 ENCOUNTER — APPOINTMENT (OUTPATIENT)
Dept: OBGYN | Facility: CLINIC | Age: 24
End: 2024-09-26

## 2024-09-27 ENCOUNTER — NON-APPOINTMENT (OUTPATIENT)
Age: 24
End: 2024-09-27

## 2024-10-04 ENCOUNTER — APPOINTMENT (OUTPATIENT)
Dept: OBGYN | Facility: CLINIC | Age: 24
End: 2024-10-04
Payer: COMMERCIAL

## 2024-10-04 VITALS
HEIGHT: 62 IN | SYSTOLIC BLOOD PRESSURE: 114 MMHG | BODY MASS INDEX: 38.46 KG/M2 | DIASTOLIC BLOOD PRESSURE: 77 MMHG | WEIGHT: 209 LBS

## 2024-10-04 LAB
APPEARANCE: CLEAR
BILIRUBIN URINE: NEGATIVE
BLOOD URINE: ABNORMAL
COLOR: YELLOW
GLUCOSE QUALITATIVE U: NEGATIVE
KETONES URINE: NEGATIVE
LEUKOCYTE ESTERASE URINE: ABNORMAL
NITRITE URINE: NEGATIVE
PH URINE: 5.5
PROTEIN URINE: 30
SPECIFIC GRAVITY URINE: >=1.03
UROBILINOGEN URINE: 0.2 (ref 0.2–?)

## 2024-10-04 PROCEDURE — 0503F POSTPARTUM CARE VISIT: CPT

## 2024-10-04 NOTE — HISTORY OF PRESENT ILLNESS
[Complications:___] : no complications [Delivery Date: ___] : on [unfilled] [Primary C/S] : delivered by  section [Male] : Delivery History: baby boy [Wt. ___] : weighing [unfilled] [Doing Well] : is doing well [No Sign of Infection] : is showing no signs of infection [Excellent Pain Control] : has excellent pain control [de-identified] : stat  fetal bradycardia, was 7cm [de-identified] : Reports she noticed some bright red blood and increased pain near right side of incision. Pumping without issue. No significant bleeding. Ambulating, Voiding with some burning sensations. Some sharp/dull pain in abdomen [de-identified] : Incision c/d/i. No erythema or induration. Prolene suture and steris removed. No drainage or odor. Possible small superfiical 2-3mm cyst near right edge of incision. Some irritation surrounding urethra. Also has small folliculitis on right vulva. No fluctuance or drainage [de-identified] : 23 yo P1, s/p stat primary  fetal bradycardia, doing well [de-identified] : Carissa bottle with urination, keep incision clean and dry, continue postpartum/postop precautions. Return to office 4wks postpartum visit or PRN

## 2024-10-07 ENCOUNTER — APPOINTMENT (OUTPATIENT)
Dept: OBGYN | Facility: CLINIC | Age: 24
End: 2024-10-07

## 2024-10-09 DIAGNOSIS — Z3A.39 39 WEEKS GESTATION OF PREGNANCY: ICD-10-CM

## 2024-10-09 DIAGNOSIS — O32.4XX0 MATERNAL CARE FOR HIGH HEAD AT TERM, NOT APPLICABLE OR UNSPECIFIED: ICD-10-CM

## 2024-10-09 DIAGNOSIS — Z28.09 IMMUNIZATION NOT CARRIED OUT BECAUSE OF OTHER CONTRAINDICATION: ICD-10-CM

## 2024-10-18 NOTE — ED PROVIDER NOTE - INPATIENT RESIDENT/ACP NOTIFIED
[facility]: [facility]  3:58 PM.  Reason for maternal/fetal assessment is hypertension disorder of pregnancy and a headache . Patient reports having had to monitor her blood pressure at home for the last few weeks and has had a headache this past week. Patient denies relief from Acetaminophen. Patient denies uterine contractions, leaking of vaginal fluid/rupture of membranes, vaginal bleeding, abdominal pain, nausea, vomiting, visual disturbances, epigastric or RUQ pain, significant edema. Patient reports fetal movement is normal. Patient is a 36w4d .  Prenatal record reviewed. Pregnancy has been complicated by hypertension.    Vital signs  pending; serial blood pressures started . Support person is present, her mom.     Action: Verbal consent for EFM. Triage assessment completed.     Response: Patient verbalized agreement with plan. Will contact JOSE RAFAEL Anglin, with lab and VS results.     
normal (ped)...
fito

## 2024-11-12 ENCOUNTER — APPOINTMENT (OUTPATIENT)
Dept: OBGYN | Facility: CLINIC | Age: 24
End: 2024-11-12
Payer: COMMERCIAL

## 2024-11-12 VITALS — WEIGHT: 205 LBS | HEIGHT: 62 IN | BODY MASS INDEX: 37.73 KG/M2 | TEMPERATURE: 97.3 F

## 2024-11-12 DIAGNOSIS — Z34.90 ENCOUNTER FOR SUPERVISION OF NORMAL PREGNANCY, UNSPECIFIED, UNSPECIFIED TRIMESTER: ICD-10-CM

## 2024-11-12 DIAGNOSIS — N61.0 MASTITIS WITHOUT ABSCESS: ICD-10-CM

## 2024-11-12 PROCEDURE — 0503F POSTPARTUM CARE VISIT: CPT

## 2024-11-17 PROBLEM — N61.0 MASTITIS, ACUTE: Status: ACTIVE | Noted: 2024-11-17

## 2024-11-17 RX ORDER — AMOXICILLIN 500 MG/1
500 TABLET, FILM COATED ORAL EVERY 8 HOURS
Qty: 21 | Refills: 1 | Status: ACTIVE | COMMUNITY
Start: 2024-11-17 | End: 1900-01-01

## 2024-12-11 ENCOUNTER — APPOINTMENT (OUTPATIENT)
Dept: OBGYN | Facility: CLINIC | Age: 24
End: 2024-12-11

## 2024-12-22 NOTE — OB PROVIDER TRIAGE NOTE - NS_FINAL[facility]_OBGYN_ALL_OB_DT
Group Therapy Note    Date: 12/22/2024    Group Start Time: 1045  Group End Time: 1120  Group Topic: Psychoeducation    [facility] AC[facility] 2    Rosetta Adams CTRS    Group Therapy Note    Attendees: 9    Date: 12/22/2024  Start Time: 1045  End Time:  1120  Number of Participants: 9    Type of Group: Psychoeducation    Name:  Sleep Hygiene     Patient's Goal:  Increased awareness of how sleep affects mental health, the sleep cycle and ways to improve sleep habits.     Notes:  CTRS led educational group discussion on sleep hygiene. Encouraged patients to share their experiences. Patient was actively engaged in group discussion and made positive responses.    Status After Intervention:  Improved    Participation Level: Active Listener and Interactive    Participation Quality: Appropriate, Attentive, and Sharing      Speech:  normal      Thought Process/Content: Logical  Linear      Affective Functioning: Congruent      Mood:  Appropriate      Level of consciousness:  Alert and Attentive      Response to Learning: Able to verbalize current knowledge/experience, Able to verbalize/acknowledge new learning, Able to retain information, Capable of insight, Able to change behavior, and Progressing to goal      Endings: None Reported    Modes of Intervention: Education, Support, Socialization, Exploration, Clarifying, and Problem-solving      Discipline Responsible: Psychoeducational Specialist      Signature:  EMELY Caraballo      29-Sep-2024

## 2025-06-24 ENCOUNTER — APPOINTMENT (OUTPATIENT)
Dept: OBGYN | Facility: CLINIC | Age: 25
End: 2025-06-24

## 2025-08-28 ENCOUNTER — APPOINTMENT (OUTPATIENT)
Dept: UROLOGY | Facility: CLINIC | Age: 25
End: 2025-08-28
Payer: COMMERCIAL

## 2025-08-28 ENCOUNTER — LABORATORY RESULT (OUTPATIENT)
Age: 25
End: 2025-08-28

## 2025-08-28 ENCOUNTER — NON-APPOINTMENT (OUTPATIENT)
Age: 25
End: 2025-08-28

## 2025-08-28 VITALS
BODY MASS INDEX: 35.51 KG/M2 | OXYGEN SATURATION: 98 % | RESPIRATION RATE: 18 BRPM | SYSTOLIC BLOOD PRESSURE: 107 MMHG | HEART RATE: 75 BPM | DIASTOLIC BLOOD PRESSURE: 69 MMHG | HEIGHT: 62 IN | WEIGHT: 193 LBS

## 2025-08-28 DIAGNOSIS — Z83.3 FAMILY HISTORY OF DIABETES MELLITUS: ICD-10-CM

## 2025-08-28 DIAGNOSIS — R39.9 UNSPECIFIED SYMPTOMS AND SIGNS INVOLVING THE GENITOURINARY SYSTEM: ICD-10-CM

## 2025-08-28 DIAGNOSIS — Z78.9 OTHER SPECIFIED HEALTH STATUS: ICD-10-CM

## 2025-08-28 DIAGNOSIS — R39.15 URGENCY OF URINATION: ICD-10-CM

## 2025-08-28 DIAGNOSIS — N32.81 OVERACTIVE BLADDER: ICD-10-CM

## 2025-08-28 DIAGNOSIS — Z80.8 FAMILY HISTORY OF MALIGNANT NEOPLASM OF OTHER ORGANS OR SYSTEMS: ICD-10-CM

## 2025-08-28 DIAGNOSIS — R30.0 DYSURIA: ICD-10-CM

## 2025-08-28 DIAGNOSIS — Z72.0 TOBACCO USE: ICD-10-CM

## 2025-08-28 DIAGNOSIS — R35.0 FREQUENCY OF MICTURITION: ICD-10-CM

## 2025-08-28 DIAGNOSIS — Z80.3 FAMILY HISTORY OF MALIGNANT NEOPLASM OF BREAST: ICD-10-CM

## 2025-08-28 PROCEDURE — G2211 COMPLEX E/M VISIT ADD ON: CPT | Mod: NC

## 2025-08-28 PROCEDURE — 99204 OFFICE O/P NEW MOD 45 MIN: CPT

## 2025-08-30 ENCOUNTER — RESULT REVIEW (OUTPATIENT)
Age: 25
End: 2025-08-30

## 2025-08-30 ENCOUNTER — OUTPATIENT (OUTPATIENT)
Dept: OUTPATIENT SERVICES | Facility: HOSPITAL | Age: 25
LOS: 1 days | End: 2025-08-30
Payer: COMMERCIAL

## 2025-08-30 DIAGNOSIS — Z00.8 ENCOUNTER FOR OTHER GENERAL EXAMINATION: ICD-10-CM

## 2025-08-30 DIAGNOSIS — R39.9 UNSPECIFIED SYMPTOMS AND SIGNS INVOLVING THE GENITOURINARY SYSTEM: ICD-10-CM

## 2025-08-30 DIAGNOSIS — Z98.890 OTHER SPECIFIED POSTPROCEDURAL STATES: Chronic | ICD-10-CM

## 2025-08-30 PROCEDURE — 76857 US EXAM PELVIC LIMITED: CPT

## 2025-08-30 PROCEDURE — 76857 US EXAM PELVIC LIMITED: CPT | Mod: 26

## 2025-08-31 DIAGNOSIS — R39.9 UNSPECIFIED SYMPTOMS AND SIGNS INVOLVING THE GENITOURINARY SYSTEM: ICD-10-CM

## 2025-09-02 LAB — BACTERIA UR CULT: NORMAL

## 2025-09-03 LAB — URINE CYTOLOGY: NORMAL
